# Patient Record
Sex: FEMALE | Race: WHITE | NOT HISPANIC OR LATINO | Employment: OTHER | URBAN - METROPOLITAN AREA
[De-identification: names, ages, dates, MRNs, and addresses within clinical notes are randomized per-mention and may not be internally consistent; named-entity substitution may affect disease eponyms.]

---

## 2017-07-24 ENCOUNTER — GENERIC CONVERSION - ENCOUNTER (OUTPATIENT)
Dept: OTHER | Facility: OTHER | Age: 62
End: 2017-07-24

## 2017-08-31 ENCOUNTER — GENERIC CONVERSION - ENCOUNTER (OUTPATIENT)
Dept: OTHER | Facility: OTHER | Age: 62
End: 2017-08-31

## 2018-01-15 NOTE — RESULT NOTES
Verified Results  (1) CBC/ PLT (NO DIFF) [id number] 12:00AM [employer]     Test Name Result Flag Reference   WBC 8 6 x10E3/uL  3 4-10 8   RBC 4 92 x10E6/uL  3 77-5 28   Hemoglobin 13 8 g/dL  11 1-15 9   Hematocrit 42 8 %  34 0-46  6   MCV 87 fL  79-97   MCH 28 0 pg  26 6-33 0   MCHC 32 2 g/dL  31 5-35 7   RDW 14 4 %  12 3-15 4   Platelets 071 B17P0/CS H 150-379     (1) COMPREHENSIVE METABOLIC PANEL [id number] 71:05MN [employer]     Test Name Result Flag Reference   Glucose, Serum 132 mg/dL H 65-99   BUN 25 mg/dL  8-27   Creatinine, Serum 0 89 mg/dL  0 57-1 00   eGFR If NonAfricn Am 71 mL/min/1 73  >59   eGFR If Africn Am 81 mL/min/1 73  >59   BUN/Creatinine Ratio 28 H 11-26   Sodium, Serum 138 mmol/L  134-144   Potassium, Serum 5 1 mmol/L  3 5-5 2   Chloride, Serum 95 mmol/L L    Carbon Dioxide, Total 16 mmol/L L 18-29   Calcium, Serum 9 8 mg/dL  8 7-10 3   Protein, Total, Serum 7 3 g/dL  6 0-8 5   Albumin, Serum 4 8 g/dL  3 6-4 8   Globulin, Total 2 5 g/dL  1 5-4 5   A/G Ratio 1 9  1 1-2 5   Bilirubin, Total 0 2 mg/dL  0 0-1 2   Alkaline Phosphatase, S 86 IU/L     AST (SGOT) 28 IU/L  0-40   ALT (SGPT) 15 IU/L  0-32     (1) LIPID PANEL, FASTING [id number] 12:00AM [employer]     Test Name Result Flag Reference   Cholesterol, Total 233 mg/dL H 100-199   Triglycerides 118 mg/dL  0-149   HDL Cholesterol 86 mg/dL  >39   According to ATP-III Guidelines, HDL-C >59 mg/dL is considered a  negative risk factor for CHD  VLDL Cholesterol Marek 24 mg/dL  5-40   LDL Cholesterol Calc 123 mg/dL H 0-99   T  Chol/HDL Ratio 2 7 ratio units  0 0-4 4   T  Chol/HDL Ratio                                                             Men  Women                                               1/2 Avg  Risk  3 4    3 3                                                   Avg Risk  5 0    4 4                                                2X Avg  Risk  9 6    7 1                                                3X Avg  Risk 23 4 11 0     (1) T3 TOTAL [id number] 12:00AM [facility]     Test Name Result Flag Reference   Triiodothyronine (T3) 102 ng/dL       (1) TSH [id number] 12:00AM [facility]     Test Name Result Flag Reference   TSH 2 670 uIU/mL  0 450-4 500     (1) URIC ACID [id number] 12:00AM [facility]     Test Name Result Flag Reference   Uric Acid, Serum 4 6 mg/dL  2 5-7 1   Therapeutic target for gout patients: <6 0     (LC) Thyroxine (T4) Free, Direct, S [id number] 12:00AM [facility]     Test Name Result Flag Reference   T4,Free(Direct) 1 30 ng/dL  0 82-1 77       Discussion/Summary   MARTIN HERNANDEZ   LOOKED PRETTY GOOD      CHOL  - PROBABLY WOULD HAVE BEEN AROUND 210 IF YOU WERE FASTING - GREAT GOOD CHOL SO NO CONCERNS     SUGAR WAS FLAGGED BUT EXPECTED LEVEL IN A NON FASTING SAMPLE      CALL IF YOU HAVE ANY QUESTIONS      DR MAN

## 2018-07-06 DIAGNOSIS — I10 ESSENTIAL HYPERTENSION: ICD-10-CM

## 2018-07-06 DIAGNOSIS — E03.9 ACQUIRED HYPOTHYROIDISM: Primary | ICD-10-CM

## 2018-07-08 RX ORDER — AMLODIPINE BESYLATE AND BENAZEPRIL HYDROCHLORIDE 10; 40 MG/1; MG/1
CAPSULE ORAL
Qty: 90 CAPSULE | Refills: 2 | Status: SHIPPED | OUTPATIENT
Start: 2018-07-08 | End: 2019-07-03 | Stop reason: SDUPTHER

## 2018-07-08 RX ORDER — LEVOTHYROXINE SODIUM 0.05 MG/1
TABLET ORAL
Qty: 90 TABLET | Refills: 2 | Status: SHIPPED | OUTPATIENT
Start: 2018-07-08 | End: 2019-07-03 | Stop reason: SDUPTHER

## 2018-09-25 DIAGNOSIS — R92.8 ABNORMAL MAMMOGRAM: Primary | ICD-10-CM

## 2019-07-03 DIAGNOSIS — I10 ESSENTIAL HYPERTENSION: ICD-10-CM

## 2019-07-03 DIAGNOSIS — E03.9 ACQUIRED HYPOTHYROIDISM: ICD-10-CM

## 2019-07-03 RX ORDER — LEVOTHYROXINE SODIUM 0.05 MG/1
TABLET ORAL
Qty: 90 TABLET | Refills: 3 | Status: SHIPPED | OUTPATIENT
Start: 2019-07-03 | End: 2020-06-29

## 2019-07-03 RX ORDER — AMLODIPINE BESYLATE AND BENAZEPRIL HYDROCHLORIDE 10; 40 MG/1; MG/1
CAPSULE ORAL
Qty: 90 CAPSULE | Refills: 3 | Status: SHIPPED | OUTPATIENT
Start: 2019-07-03 | End: 2020-06-29

## 2019-09-26 DIAGNOSIS — R92.8 ABNORMAL MAMMOGRAM: ICD-10-CM

## 2020-06-27 DIAGNOSIS — I10 ESSENTIAL HYPERTENSION: ICD-10-CM

## 2020-06-27 DIAGNOSIS — E03.9 ACQUIRED HYPOTHYROIDISM: ICD-10-CM

## 2020-06-29 RX ORDER — AMLODIPINE BESYLATE AND BENAZEPRIL HYDROCHLORIDE 10; 40 MG/1; MG/1
CAPSULE ORAL
Qty: 90 CAPSULE | Refills: 3 | Status: SHIPPED | OUTPATIENT
Start: 2020-06-29 | End: 2021-06-01

## 2020-06-29 RX ORDER — LEVOTHYROXINE SODIUM 0.05 MG/1
TABLET ORAL
Qty: 90 TABLET | Refills: 3 | Status: SHIPPED | OUTPATIENT
Start: 2020-06-29 | End: 2021-06-01

## 2021-05-26 ENCOUNTER — OFFICE VISIT (OUTPATIENT)
Dept: FAMILY MEDICINE CLINIC | Facility: CLINIC | Age: 66
End: 2021-05-26
Payer: COMMERCIAL

## 2021-05-26 VITALS
HEIGHT: 63 IN | HEART RATE: 100 BPM | BODY MASS INDEX: 19.49 KG/M2 | WEIGHT: 110 LBS | SYSTOLIC BLOOD PRESSURE: 138 MMHG | RESPIRATION RATE: 16 BRPM | TEMPERATURE: 98.2 F | DIASTOLIC BLOOD PRESSURE: 66 MMHG | OXYGEN SATURATION: 98 %

## 2021-05-26 DIAGNOSIS — Z13.220 SCREENING FOR HYPERLIPIDEMIA: ICD-10-CM

## 2021-05-26 DIAGNOSIS — Z76.89 ESTABLISHING CARE WITH NEW DOCTOR, ENCOUNTER FOR: Primary | ICD-10-CM

## 2021-05-26 DIAGNOSIS — R01.1 SYSTOLIC EJECTION MURMUR: ICD-10-CM

## 2021-05-26 DIAGNOSIS — Z11.4 SCREENING FOR HIV (HUMAN IMMUNODEFICIENCY VIRUS): ICD-10-CM

## 2021-05-26 DIAGNOSIS — I10 ESSENTIAL HYPERTENSION: ICD-10-CM

## 2021-05-26 DIAGNOSIS — Z11.59 ENCOUNTER FOR HEPATITIS C SCREENING TEST FOR LOW RISK PATIENT: ICD-10-CM

## 2021-05-26 DIAGNOSIS — E03.9 HYPOTHYROIDISM, UNSPECIFIED TYPE: ICD-10-CM

## 2021-05-26 DIAGNOSIS — Z12.31 ENCOUNTER FOR SCREENING MAMMOGRAM FOR BREAST CANCER: ICD-10-CM

## 2021-05-26 DIAGNOSIS — K21.9 GERD WITHOUT ESOPHAGITIS: ICD-10-CM

## 2021-05-26 PROCEDURE — 1036F TOBACCO NON-USER: CPT | Performed by: FAMILY MEDICINE

## 2021-05-26 PROCEDURE — 3725F SCREEN DEPRESSION PERFORMED: CPT | Performed by: FAMILY MEDICINE

## 2021-05-26 PROCEDURE — 99203 OFFICE O/P NEW LOW 30 MIN: CPT | Performed by: FAMILY MEDICINE

## 2021-05-26 RX ORDER — AMANTADINE HYDROCHLORIDE 100 MG/1
100 CAPSULE, GELATIN COATED ORAL 2 TIMES DAILY
COMMUNITY
End: 2021-09-07

## 2021-05-26 RX ORDER — OMEPRAZOLE 40 MG/1
40 CAPSULE, DELAYED RELEASE ORAL DAILY
COMMUNITY
End: 2022-03-08 | Stop reason: DRUGHIGH

## 2021-05-26 RX ORDER — ESTRADIOL 10 UG/1
INSERT VAGINAL
COMMUNITY
Start: 2015-04-20 | End: 2021-09-07

## 2021-05-26 NOTE — PROGRESS NOTES
Assessment/Plan:    No problem-specific Assessment & Plan notes found for this encounter  Diagnoses and all orders for this visit:    Establishing care with new doctor, encounter for    Essential hypertension  -     Echo complete with contrast if indicated; Future  -     Comprehensive metabolic panel  -     Uric acid    Hypothyroidism, unspecified type  -     TSH, 3rd generation    GERD without esophagitis  -     CBC and differential    Systolic ejection murmur  -     Echo complete with contrast if indicated; Future    Encounter for hepatitis C screening test for low risk patient  -     Hepatitis C antibody    Screening for HIV (human immunodeficiency virus)  -     Human Immunodeficiency Virus 1/2 Antigen / Antibody ( Fourth Generation) with Reflex Testing    Encounter for screening mammogram for breast cancer  -     Mammo screening bilateral w 3d & cad; Future    Screening for hyperlipidemia  -     Lipid panel    Other orders  -     estradiol (Vagifem) 10 MCG TABS vaginal tablet; Insert into the vagina  -     amantadine (SYMMETREL) 100 mg capsule; Take 100 mg by mouth 2 (two) times a day  -     omeprazole (PriLOSEC) 40 MG capsule; Take 40 mg by mouth daily          Patient Instructions   CONTINUE CURRENT TREATMENT PLAN  MONITOR DIETARY SODIUM, CHOL INTAKE  ENCOURAGE PHYSICAL ACTIVITY      RV 3 M FOR CPX, SOONER PRN      BW TODAY  ECHO FOR MURMUR EVALUATION      Return in about 3 months (around 8/26/2021) for Annual physical     Subjective:      Patient ID: Erwin Rahman is a 73 y o  female      Chief Complaint   Patient presents with   [address]     B/P & THYROID CHECK       RE ESTABLISH PATIENT TO THE PRACTICE    REVIEWED PAST MEDICAL HISTORY AND MEDICATIONS    DISCUSSED PRACTICE ISSUES    RECOMMEND CPX IN THE NEAR FUTURE        The following portions of the patient's history were reviewed and updated as appropriate: allergies, current medications, past family history, past medical history, past social history, past surgical history and problem list     Review of Systems   Constitutional: Negative for chills, fatigue and fever  HENT: Negative for congestion, ear discharge, ear pain, mouth sores, postnasal drip, sore throat and trouble swallowing  Eyes: Negative for pain, discharge and visual disturbance  Respiratory: Negative for cough, shortness of breath and wheezing  Cardiovascular: Negative for chest pain, palpitations and leg swelling  Gastrointestinal: Negative for abdominal distention, abdominal pain, blood in stool, diarrhea and nausea  Endocrine: Negative for polydipsia, polyphagia and polyuria  Genitourinary: Negative for dysuria, frequency, hematuria and urgency  Musculoskeletal: Negative for arthralgias, gait problem and joint swelling  Skin: Negative for pallor and rash  Neurological: Negative for dizziness, syncope, speech difficulty, weakness, light-headedness, numbness and headaches  Hematological: Negative for adenopathy  Psychiatric/Behavioral: Negative for behavioral problems, confusion and sleep disturbance  The patient is not nervous/anxious  Current Outpatient Medications   Medication Sig Dispense Refill    amantadine (SYMMETREL) 100 mg capsule Take 100 mg by mouth 2 (two) times a day      amLODIPine-benazepril (LOTREL) 10-40 MG per capsule TAKE 1 CAPSULE DAILY 90 capsule 3    estradiol (Vagifem) 10 MCG TABS vaginal tablet Insert into the vagina      levothyroxine 50 mcg tablet TAKE 1 TABLET DAILY AS DIRECTED 90 tablet 3    omeprazole (PriLOSEC) 40 MG capsule Take 40 mg by mouth daily       No current facility-administered medications for this visit  Objective:    /66   Pulse 100   Temp 98 2 °F (36 8 °C) (Temporal)   Resp 16   Ht 5' 3" (1 6 m)   Wt 49 9 kg (110 lb)   SpO2 98%   BMI 19 49 kg/m²        Physical Exam  Constitutional:       Appearance: She is well-developed  HENT:      Head: Normocephalic and atraumatic     Eyes: General:         Right eye: No discharge  Left eye: No discharge  Conjunctiva/sclera: Conjunctivae normal       Pupils: Pupils are equal, round, and reactive to light  Neck:      Musculoskeletal: Normal range of motion and neck supple  Thyroid: No thyromegaly  Cardiovascular:      Rate and Rhythm: Normal rate and regular rhythm  Heart sounds: Murmur present  Pulmonary:      Effort: Pulmonary effort is normal  No respiratory distress  Breath sounds: Normal breath sounds  No wheezing or rales  Abdominal:      General: Bowel sounds are normal       Palpations: Abdomen is soft  Tenderness: There is no abdominal tenderness  Musculoskeletal: Normal range of motion  General: No tenderness  Lymphadenopathy:      Cervical: No cervical adenopathy  Skin:     General: Skin is warm and dry  Findings: No erythema or rash  Neurological:      Mental Status: She is alert and oriented to person, place, and time  Psychiatric:         Behavior: Behavior normal          Thought Content:  Thought content normal          Judgment: Judgment normal                 Liyah Orta MD

## 2021-05-26 NOTE — PATIENT INSTRUCTIONS
CONTINUE CURRENT TREATMENT PLAN  MONITOR DIETARY SODIUM, CHOL INTAKE  ENCOURAGE PHYSICAL ACTIVITY      RV 3 M FOR CPX, SOONER PRN      BW TODAY  ECHO FOR MURMUR EVALUATION

## 2021-05-27 LAB
ALBUMIN SERPL-MCNC: 4.4 G/DL (ref 3.8–4.8)
ALBUMIN/GLOB SERPL: 1.8 {RATIO} (ref 1.2–2.2)
ALP SERPL-CCNC: 92 IU/L (ref 48–121)
ALT SERPL-CCNC: 16 IU/L (ref 0–32)
AST SERPL-CCNC: 26 IU/L (ref 0–40)
BASOPHILS # BLD AUTO: 0.1 X10E3/UL (ref 0–0.2)
BASOPHILS NFR BLD AUTO: 1 %
BILIRUB SERPL-MCNC: <0.2 MG/DL (ref 0–1.2)
BUN SERPL-MCNC: 27 MG/DL (ref 8–27)
BUN/CREAT SERPL: 28 (ref 12–28)
CALCIUM SERPL-MCNC: 9.7 MG/DL (ref 8.7–10.3)
CHLORIDE SERPL-SCNC: 103 MMOL/L (ref 96–106)
CHOLEST SERPL-MCNC: 179 MG/DL (ref 100–199)
CHOLEST/HDLC SERPL: 2.7 RATIO (ref 0–4.4)
CO2 SERPL-SCNC: 21 MMOL/L (ref 20–29)
CREAT SERPL-MCNC: 0.98 MG/DL (ref 0.57–1)
EOSINOPHIL # BLD AUTO: 0 X10E3/UL (ref 0–0.4)
EOSINOPHIL NFR BLD AUTO: 0 %
ERYTHROCYTE [DISTWIDTH] IN BLOOD BY AUTOMATED COUNT: 18.8 % (ref 11.7–15.4)
GLOBULIN SER-MCNC: 2.5 G/DL (ref 1.5–4.5)
GLUCOSE SERPL-MCNC: 103 MG/DL (ref 65–99)
HCT VFR BLD AUTO: 23.9 % (ref 34–46.6)
HCV AB S/CO SERPL IA: <0.1 S/CO RATIO (ref 0–0.9)
HDLC SERPL-MCNC: 66 MG/DL
HGB BLD-MCNC: 6.2 G/DL (ref 11.1–15.9)
HIV 1+2 AB+HIV1 P24 AG SERPL QL IA: NON REACTIVE
IMM GRANULOCYTES # BLD: 0 X10E3/UL (ref 0–0.1)
IMM GRANULOCYTES NFR BLD: 0 %
LDLC SERPL CALC-MCNC: 98 MG/DL (ref 0–99)
LYMPHOCYTES # BLD AUTO: 1.2 X10E3/UL (ref 0.7–3.1)
LYMPHOCYTES NFR BLD AUTO: 20 %
MCH RBC QN AUTO: 17.2 PG (ref 26.6–33)
MCHC RBC AUTO-ENTMCNC: 25.9 G/DL (ref 31.5–35.7)
MCV RBC AUTO: 66 FL (ref 79–97)
MONOCYTES # BLD AUTO: 0.8 X10E3/UL (ref 0.1–0.9)
MONOCYTES NFR BLD AUTO: 14 %
NEUTROPHILS # BLD AUTO: 3.7 X10E3/UL (ref 1.4–7)
NEUTROPHILS NFR BLD AUTO: 65 %
NRBC BLD AUTO-RTO: 8 % (ref 0–0)
PLATELET # BLD AUTO: 361 X10E3/UL (ref 150–450)
POTASSIUM SERPL-SCNC: 5.2 MMOL/L (ref 3.5–5.2)
PROT SERPL-MCNC: 6.9 G/DL (ref 6–8.5)
RBC # BLD AUTO: 3.6 X10E6/UL (ref 3.77–5.28)
SL AMB EGFR AFRICAN AMERICAN: 70 ML/MIN/1.73
SL AMB EGFR NON AFRICAN AMERICAN: 61 ML/MIN/1.73
SL AMB VLDL CHOLESTEROL CALC: 15 MG/DL (ref 5–40)
SODIUM SERPL-SCNC: 137 MMOL/L (ref 134–144)
TRIGL SERPL-MCNC: 81 MG/DL (ref 0–149)
TSH SERPL DL<=0.005 MIU/L-ACNC: 3.2 UIU/ML (ref 0.45–4.5)
URATE SERPL-MCNC: 5.8 MG/DL (ref 3–7.2)
WBC # BLD AUTO: 5.7 X10E3/UL (ref 3.4–10.8)

## 2021-06-01 ENCOUNTER — TRANSITIONAL CARE MANAGEMENT (OUTPATIENT)
Dept: FAMILY MEDICINE CLINIC | Facility: CLINIC | Age: 66
End: 2021-06-01

## 2021-06-01 DIAGNOSIS — E03.9 ACQUIRED HYPOTHYROIDISM: ICD-10-CM

## 2021-06-01 DIAGNOSIS — I10 ESSENTIAL HYPERTENSION: ICD-10-CM

## 2021-06-01 RX ORDER — AMLODIPINE BESYLATE AND BENAZEPRIL HYDROCHLORIDE 10; 40 MG/1; MG/1
CAPSULE ORAL
Qty: 90 CAPSULE | Refills: 3 | Status: SHIPPED | OUTPATIENT
Start: 2021-06-01 | End: 2021-08-05 | Stop reason: SDUPTHER

## 2021-06-01 RX ORDER — LEVOTHYROXINE SODIUM 0.05 MG/1
TABLET ORAL
Qty: 90 TABLET | Refills: 3 | Status: SHIPPED | OUTPATIENT
Start: 2021-06-01 | End: 2021-08-05 | Stop reason: SDUPTHER

## 2021-06-08 ENCOUNTER — RA CDI HCC (OUTPATIENT)
Dept: OTHER | Facility: HOSPITAL | Age: 66
End: 2021-06-08

## 2021-06-08 NOTE — PROGRESS NOTES
[facility] 76  coding opportunities          Chart reviewed, no opportunity found: CHART REVIEWED, NO OPPORTUNITY FOUND              Patients insurance company: [employer]

## 2021-06-11 ENCOUNTER — OFFICE VISIT (OUTPATIENT)
Dept: FAMILY MEDICINE CLINIC | Facility: CLINIC | Age: 66
End: 2021-06-11
Payer: COMMERCIAL

## 2021-06-11 VITALS
OXYGEN SATURATION: 96 % | TEMPERATURE: 97.1 F | RESPIRATION RATE: 16 BRPM | WEIGHT: 110 LBS | DIASTOLIC BLOOD PRESSURE: 68 MMHG | BODY MASS INDEX: 19.49 KG/M2 | HEIGHT: 63 IN | SYSTOLIC BLOOD PRESSURE: 138 MMHG | HEART RATE: 94 BPM

## 2021-06-11 DIAGNOSIS — D50.0 IRON DEFICIENCY ANEMIA DUE TO CHRONIC BLOOD LOSS: ICD-10-CM

## 2021-06-11 DIAGNOSIS — Z76.89 ENCOUNTER FOR SUPPORT AND COORDINATION OF TRANSITION OF CARE: Primary | ICD-10-CM

## 2021-06-11 DIAGNOSIS — K21.9 GERD WITHOUT ESOPHAGITIS: ICD-10-CM

## 2021-06-11 DIAGNOSIS — R01.1 SYSTOLIC EJECTION MURMUR: ICD-10-CM

## 2021-06-11 DIAGNOSIS — I10 ESSENTIAL HYPERTENSION: ICD-10-CM

## 2021-06-11 PROCEDURE — 3008F BODY MASS INDEX DOCD: CPT | Performed by: FAMILY MEDICINE

## 2021-06-11 PROCEDURE — 99496 TRANSJ CARE MGMT HIGH F2F 7D: CPT | Performed by: NURSE PRACTITIONER

## 2021-06-12 PROBLEM — D50.0 IRON DEFICIENCY ANEMIA DUE TO CHRONIC BLOOD LOSS: Status: ACTIVE | Noted: 2021-06-12

## 2021-06-12 NOTE — PROGRESS NOTES
Assessment/Plan:    1  Encounter for support and coordination of transition of care  Comments:  Reviewed discharge instructions and medications  Advise specialty follow up as instructed  2  Iron deficiency anemia due to chronic blood loss  Assessment & Plan:  Pt has scheduled follow up with GI and hematology - Dr Ashlyn Ma  She is recommended to have a bone marrow biopsy r/t history of Non Hodgkin Lymphoma 40 years ago and she reports she is hesitant to proceed  Advise further discussion with Dr Ashlyn Ma  3  GERD without esophagitis  Assessment & Plan:  Taking omeprazole 40 mg daily  Symptoms currently stable  4  Systolic ejection murmur  Assessment & Plan:  ECHO was completed through [facility] and we will request these records for review  5  Essential hypertension  Assessment & Plan:  BP stable  Denies SOB, chest pain or extremity swelling  Subjective:      Patient ID: Lisset Flores is a 73 y o  female  Chief Complaint   Patient presents with    Transition of Care Management       Kassandra Smith is a 73year old female with history of NH Lymphoma and gastric ulcers who presents to the office for a transition of care s/p hospitalization for anemia and Hgb 6 2  Currently, she denies fever, chills, chest pain, shortness of breath or fatigue  Doing well overall  The following portions of the patient's history were reviewed and updated as appropriate: allergies, current medications, past family history, past medical history, past social history, past surgical history and problem list     Review of Systems   Constitutional: Negative for chills, fatigue and fever  Respiratory: Negative for chest tightness and shortness of breath  Cardiovascular: Negative for chest pain, palpitations and leg swelling  Gastrointestinal: Negative for abdominal pain and blood in stool           Current Outpatient Medications   Medication Sig Dispense Refill    amLODIPine-benazepril (LOTREL) 10-40 MG per capsule TAKE 1 CAPSULE DAILY 90 capsule 3    levothyroxine 50 mcg tablet TAKE 1 TABLET DAILY AS DIRECTED 90 tablet 3    omeprazole (PriLOSEC) 40 MG capsule Take 40 mg by mouth daily      amantadine (SYMMETREL) 100 mg capsule Take 100 mg by mouth 2 (two) times a day      estradiol (Vagifem) 10 MCG TABS vaginal tablet Insert into the vagina       No current facility-administered medications for this visit  Objective:    /68   Pulse 94   Temp (!) 97 1 °F (36 2 °C) (Temporal)   Resp 16   Ht 5' 3" (1 6 m)   Wt 49 9 kg (110 lb)   SpO2 96%   BMI 19 49 kg/m²        Physical Exam  Vitals signs reviewed  Constitutional:       General: She is not in acute distress  Appearance: She is well-developed  She is not diaphoretic  HENT:      Head: Normocephalic and atraumatic  Eyes:      General:         Right eye: No discharge  Left eye: No discharge  Conjunctiva/sclera: Conjunctivae normal    Neck:      Musculoskeletal: Normal range of motion and neck supple  Thyroid: No thyromegaly  Cardiovascular:      Rate and Rhythm: Normal rate and regular rhythm  Heart sounds: Normal heart sounds  Pulmonary:      Effort: Pulmonary effort is normal  No respiratory distress  Breath sounds: Normal breath sounds  No decreased breath sounds, wheezing, rhonchi or rales  Abdominal:      General: Bowel sounds are normal  There is no distension  Palpations: Abdomen is soft  Tenderness: There is no abdominal tenderness  Lymphadenopathy:      Cervical: No cervical adenopathy  Skin:     General: Skin is warm and dry  Findings: No rash  Neurological:      Mental Status: She is alert and oriented to person, place, and time  Psychiatric:         Behavior: Behavior normal          Thought Content: Thought content normal          Judgment: Judgment normal            Mira Gay NP student, assisted in history and PE         PRICE Cruz     TCM Call (since 5/12/2021)     Date and time call was made  6/1/2021  1:22 PM    Hospital care reviewed  Records reviewed    [address]    Date of Admission  05/27/21    Date of discharge  05/29/21    Diagnosis  Acute chronic anemia    Disposition  Home    Were the patients medications reviewed and updated  Yes    Current Symptoms  None      TCM Call (since 5/12/2021)     Post hospital issues  None    Should patient be enrolled in anticoag monitoring? No    Scheduled for follow up?   Yes    Patients specialists  Other (comment)    Other specialists names  hemotologist    Other specialists contcat #  Dr Nguyễn Burton  [facility])    Did you obtain your prescribed medications  Yes    Do you need help managing your prescriptions or medications  No    Is transportation to your appointment needed  No    I have advised the patient to call PCP with any new or worsening symptoms  [address] [place] [address] or [employer] other    Support System  Family    The type of support provided  Physical; Emotional; Financial    Do you have social support  Yes, as much as I need    Are you recieving any outpatient services  No    Are you recieving home care services  No    Are you using any community resources  No

## 2021-06-12 NOTE — ASSESSMENT & PLAN NOTE
Pt has scheduled follow up with GI and hematology - Dr Luis Redding  She is recommended to have a bone marrow biopsy r/t history of Non Hodgkin Lymphoma 40 years ago and she reports she is hesitant to proceed  Advise further discussion with Dr Luis Redding

## 2021-06-12 NOTE — ASSESSMENT & PLAN NOTE
ECHO was completed through [facility] and we will request these records for review

## 2021-07-08 ENCOUNTER — APPOINTMENT (OUTPATIENT)
Dept: RADIOLOGY | Facility: CLINIC | Age: 66
End: 2021-07-08
Payer: COMMERCIAL

## 2021-07-08 ENCOUNTER — OFFICE VISIT (OUTPATIENT)
Dept: FAMILY MEDICINE CLINIC | Facility: CLINIC | Age: 66
End: 2021-07-08
Payer: COMMERCIAL

## 2021-07-08 VITALS
DIASTOLIC BLOOD PRESSURE: 76 MMHG | SYSTOLIC BLOOD PRESSURE: 134 MMHG | HEART RATE: 100 BPM | TEMPERATURE: 97.8 F | WEIGHT: 108 LBS | BODY MASS INDEX: 19.14 KG/M2 | OXYGEN SATURATION: 97 % | HEIGHT: 63 IN | RESPIRATION RATE: 16 BRPM

## 2021-07-08 DIAGNOSIS — M79.672 LEFT FOOT PAIN: ICD-10-CM

## 2021-07-08 DIAGNOSIS — R22.42 LOCALIZED SWELLING OF LEFT FOOT: ICD-10-CM

## 2021-07-08 DIAGNOSIS — M79.672 LEFT FOOT PAIN: Primary | ICD-10-CM

## 2021-07-08 PROCEDURE — 99213 OFFICE O/P EST LOW 20 MIN: CPT | Performed by: NURSE PRACTITIONER

## 2021-07-08 PROCEDURE — 73630 X-RAY EXAM OF FOOT: CPT

## 2021-07-08 PROCEDURE — 1036F TOBACCO NON-USER: CPT | Performed by: NURSE PRACTITIONER

## 2021-07-08 NOTE — PROGRESS NOTES
Assessment/Plan:    1  Left foot pain  -     XR foot 3+ vw left; Future; Expected date: 07/08/2021    2  Localized swelling of left foot  -     XR foot 3+ vw left; Future; Expected date: 07/08/2021            Patient Instructions   Aleve 1 tablet every 8 hours, take with food (for 5 days)      Return in about 1 week (around 7/15/2021), or if symptoms worsen or fail to improve  Subjective:      Patient ID: Divine Castaneda is a 73 y o  female  Chief Complaint   Patient presents with    left foot soreness       Laxmi Baxter is a 73year old female who presents to the office for evaluation and management of left foot swelling  Reports her left foot has been swollen for a little over 2 weeks  Denies injury  Reports her right foot had similar symptoms about 7 months ago and resolved on its own after about 2 weeks  States her symptoms are worse throughout the day when she is on her feet  Denies ankle or calf pain  Reports swelling of the entire foot  The following portions of the patient's history were reviewed and updated as appropriate: allergies, current medications, past family history, past medical history, past social history, past surgical history and problem list     Review of Systems   Musculoskeletal: Positive for arthralgias, joint swelling and myalgias  Current Outpatient Medications   Medication Sig Dispense Refill    amLODIPine-benazepril (LOTREL) 10-40 MG per capsule TAKE 1 CAPSULE DAILY 90 capsule 3    estradiol (Vagifem) 10 MCG TABS vaginal tablet Insert into the vagina      levothyroxine 50 mcg tablet TAKE 1 TABLET DAILY AS DIRECTED 90 tablet 3    omeprazole (PriLOSEC) 40 MG capsule Take 40 mg by mouth daily      amantadine (SYMMETREL) 100 mg capsule Take 100 mg by mouth 2 (two) times a day (Patient not taking: Reported on 7/8/2021)       No current facility-administered medications for this visit         Objective:    /76   Pulse 100   Temp 97 8 °F (36 6 °C) (Temporal) Resp 16   Ht 5' 3" (1 6 m)   Wt 49 kg (108 lb)   SpO2 97%   BMI 19 13 kg/m²        Physical Exam  Vitals reviewed  HENT:      Head: Normocephalic  Musculoskeletal:      Left foot: Normal range of motion  No deformity or bunion  Feet:    Feet:      Left foot:      Skin integrity: No erythema  Comments: Pt indicates pain on the anterior aspect of the distal left foot  Grade I pitting edema noted of the left foot  Neurological:      Mental Status: She is alert and oriented to person, place, and time     Psychiatric:         Mood and Affect: Mood normal                 PRICE Guerra

## 2021-07-12 ENCOUNTER — OFFICE VISIT (OUTPATIENT)
Dept: OBGYN CLINIC | Facility: CLINIC | Age: 66
End: 2021-07-12
Payer: COMMERCIAL

## 2021-07-12 ENCOUNTER — APPOINTMENT (OUTPATIENT)
Dept: RADIOLOGY | Facility: CLINIC | Age: 66
End: 2021-07-12
Payer: COMMERCIAL

## 2021-07-12 VITALS
HEIGHT: 63 IN | HEART RATE: 90 BPM | DIASTOLIC BLOOD PRESSURE: 66 MMHG | WEIGHT: 108 LBS | BODY MASS INDEX: 19.14 KG/M2 | SYSTOLIC BLOOD PRESSURE: 138 MMHG

## 2021-07-12 DIAGNOSIS — S92.325A CLOSED NONDISPLACED FRACTURE OF SECOND METATARSAL BONE OF LEFT FOOT, INITIAL ENCOUNTER: ICD-10-CM

## 2021-07-12 PROCEDURE — 28470 CLTX METATARSAL FX WO MNP EA: CPT | Performed by: ORTHOPAEDIC SURGERY

## 2021-07-12 PROCEDURE — 73630 X-RAY EXAM OF FOOT: CPT

## 2021-07-12 PROCEDURE — 99203 OFFICE O/P NEW LOW 30 MIN: CPT | Performed by: ORTHOPAEDIC SURGERY

## 2021-07-12 PROCEDURE — 3008F BODY MASS INDEX DOCD: CPT | Performed by: ORTHOPAEDIC SURGERY

## 2021-07-12 PROCEDURE — 1036F TOBACCO NON-USER: CPT | Performed by: ORTHOPAEDIC SURGERY
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
## 2021-08-05 DIAGNOSIS — I10 ESSENTIAL HYPERTENSION: ICD-10-CM

## 2021-08-05 DIAGNOSIS — E03.9 ACQUIRED HYPOTHYROIDISM: ICD-10-CM

## 2021-08-05 RX ORDER — LEVOTHYROXINE SODIUM 0.05 MG/1
50 TABLET ORAL DAILY
Qty: 90 TABLET | Refills: 3 | Status: SHIPPED | OUTPATIENT
Start: 2021-08-05 | End: 2021-09-08 | Stop reason: SDUPTHER

## 2021-08-05 RX ORDER — AMLODIPINE BESYLATE AND BENAZEPRIL HYDROCHLORIDE 10; 40 MG/1; MG/1
1 CAPSULE ORAL DAILY
Qty: 90 CAPSULE | Refills: 3 | Status: SHIPPED | OUTPATIENT
Start: 2021-08-05 | End: 2021-09-07 | Stop reason: HOSPADM

## 2021-08-09 ENCOUNTER — OFFICE VISIT (OUTPATIENT)
Dept: OBGYN CLINIC | Facility: CLINIC | Age: 66
End: 2021-08-09

## 2021-08-09 ENCOUNTER — APPOINTMENT (OUTPATIENT)
Dept: RADIOLOGY | Facility: CLINIC | Age: 66
End: 2021-08-09
Payer: COMMERCIAL

## 2021-08-09 VITALS
WEIGHT: 108 LBS | SYSTOLIC BLOOD PRESSURE: 118 MMHG | HEART RATE: 90 BPM | BODY MASS INDEX: 19.14 KG/M2 | DIASTOLIC BLOOD PRESSURE: 62 MMHG | HEIGHT: 63 IN

## 2021-08-09 DIAGNOSIS — S92.325A CLOSED NONDISPLACED FRACTURE OF SECOND METATARSAL BONE OF LEFT FOOT, INITIAL ENCOUNTER: Primary | ICD-10-CM

## 2021-08-09 DIAGNOSIS — S92.325A CLOSED NONDISPLACED FRACTURE OF SECOND METATARSAL BONE OF LEFT FOOT, INITIAL ENCOUNTER: ICD-10-CM

## 2021-08-09 PROCEDURE — 99024 POSTOP FOLLOW-UP VISIT: CPT | Performed by: ORTHOPAEDIC SURGERY

## 2021-08-09 PROCEDURE — 73630 X-RAY EXAM OF FOOT: CPT

## 2021-08-09 PROCEDURE — 3008F BODY MASS INDEX DOCD: CPT | Performed by: ORTHOPAEDIC SURGERY

## 2021-08-09 NOTE — PROGRESS NOTES
Assessment/Plan:  1  Closed nondisplaced fracture of second metatarsal bone of left foot, initial encounter  XR foot 3+ vw left         Jelly Campos is doing very well following her stress fracture in left foot  She has improvement in her pain and swelling  She still has some mild discomfort to palpation in this area and her x-ray shows some callus formation but a continued fracture line  I recommended continued use of the Cam boot for another 3 weeks and follow up in the office for repeat x-ray evaluation  Subjective:   Nichole He is a 73 y o  female who presents   To the office for follow-up for left foot fracture  She has second metatarsal fracture which appeared to be a stress fracture  She was placed in a Cam walker boot for the last 4 weeks  She reports significant improvement in her pain and no pain with walking in a boot  She denies any new injury  She denies any numbness and tingling throughout her foot  History reviewed  No pertinent past medical history  Past Surgical History:   Procedure Laterality Date    ABDOMINAL SURGERY         History reviewed  No pertinent family history      Social History     Occupational History    Not on file   Tobacco Use    Smoking status: Never Smoker    Smokeless tobacco: Never Used   Vaping Use    Vaping Use: Never used   Substance and Sexual Activity    Alcohol use: Yes     Comment: SOC    Drug use: Never    Sexual activity: Not on file         Current Outpatient Medications:     amantadine (SYMMETREL) 100 mg capsule, Take 100 mg by mouth 2 (two) times a day (Patient not taking: Reported on 7/8/2021), Disp: , Rfl:     amLODIPine-benazepril (LOTREL) 10-40 MG per capsule, Take 1 capsule by mouth daily, Disp: 90 capsule, Rfl: 3    estradiol (Vagifem) 10 MCG TABS vaginal tablet, Insert into the vagina, Disp: , Rfl:     levothyroxine 50 mcg tablet, Take 1 tablet (50 mcg total) by mouth daily, Disp: 90 tablet, Rfl: 3    omeprazole (PriLOSEC) 40 MG capsule, Take 40 mg by mouth daily, Disp: , Rfl:     Allergies   Allergen Reactions    Amoxicillin Hives    Clavulanic Acid Hives    Pollen Extract Nasal Congestion    Other Hives and Rash     AMOXICIILLIN       Objective:  Vitals:    08/09/21 0901   BP: 118/62   Pulse: 90       Ortho Exam    Physical Exam  Vitals reviewed  Constitutional:       Appearance: She is well-developed  HENT:      Head: Normocephalic and atraumatic  Eyes:      Conjunctiva/sclera: Conjunctivae normal       Pupils: Pupils are equal, round, and reactive to light  Cardiovascular:      Rate and Rhythm: Normal rate  Pulmonary:      Effort: Pulmonary effort is normal  No respiratory distress  Musculoskeletal:      Cervical back: Normal range of motion and neck supple  Feet:    Feet:      Comments:   Mild tenderness to palpation over second third metatarsal distally  Skin:     General: Skin is warm and dry  Neurological:      Mental Status: She is alert and oriented to person, place, and time  Psychiatric:         Behavior: Behavior normal            I have personally reviewed pertinent films in PACS and my interpretation is as follows: Three-view x-rays of the left foot in the office today demonstrate a stable, healing fracture of the second metatarsal with increased callus formation

## 2021-08-11 DIAGNOSIS — E03.9 HYPOTHYROIDISM, UNSPECIFIED TYPE: ICD-10-CM

## 2021-08-11 DIAGNOSIS — K21.9 GERD WITHOUT ESOPHAGITIS: ICD-10-CM

## 2021-08-11 DIAGNOSIS — I10 ESSENTIAL HYPERTENSION: ICD-10-CM

## 2021-08-11 DIAGNOSIS — D50.0 IRON DEFICIENCY ANEMIA DUE TO CHRONIC BLOOD LOSS: ICD-10-CM

## 2021-08-11 DIAGNOSIS — Z00.00 ENCOUNTER FOR ANNUAL WELLNESS VISIT (AWV) IN MEDICARE PATIENT: Primary | ICD-10-CM

## 2021-08-11 DIAGNOSIS — R01.1 SYSTOLIC EJECTION MURMUR: ICD-10-CM

## 2021-08-30 ENCOUNTER — APPOINTMENT (OUTPATIENT)
Dept: RADIOLOGY | Facility: CLINIC | Age: 66
End: 2021-08-30
Payer: COMMERCIAL

## 2021-08-30 ENCOUNTER — OFFICE VISIT (OUTPATIENT)
Dept: OBGYN CLINIC | Facility: CLINIC | Age: 66
End: 2021-08-30

## 2021-08-30 VITALS
DIASTOLIC BLOOD PRESSURE: 60 MMHG | BODY MASS INDEX: 19.14 KG/M2 | WEIGHT: 108 LBS | HEART RATE: 90 BPM | SYSTOLIC BLOOD PRESSURE: 136 MMHG | HEIGHT: 63 IN

## 2021-08-30 DIAGNOSIS — S92.325A CLOSED NONDISPLACED FRACTURE OF SECOND METATARSAL BONE OF LEFT FOOT, INITIAL ENCOUNTER: Primary | ICD-10-CM

## 2021-08-30 PROCEDURE — 73630 X-RAY EXAM OF FOOT: CPT

## 2021-08-30 PROCEDURE — 99024 POSTOP FOLLOW-UP VISIT: CPT | Performed by: ORTHOPAEDIC SURGERY

## 2021-08-30 PROCEDURE — 3008F BODY MASS INDEX DOCD: CPT | Performed by: ORTHOPAEDIC SURGERY

## 2021-08-30 NOTE — PROGRESS NOTES
Assessment/Plan:  1  Closed nondisplaced fracture of second metatarsal bone of left foot, initial encounter  XR foot 3+ vw left         Krista Coulter has  A stable, healing stress fracture in the second metatarsal left foot  I do believe at this point she is nontender in this area the fracture will continue to heal   Her x-ray show significant callus and should continue to heal on its own  She was advised to gradually come out of the boot and return to regular shoe as tolerated  She can resume activity as tolerated as well  She will follow up only if the pain returns  Subjective:   Javed Magallanes is a 73 y o  female who presents   For follow-up for a second metatarsal fracture believed to be a stress fracture from walking  She has been in a Cam boot for 8 weeks and reports significant improvement in her foot  She denies any pain but feels like it is very full in her foot  She has been able to ambulate in a regular shoe and this does not bother her  She denies any new injury  History reviewed  No pertinent past medical history  Past Surgical History:   Procedure Laterality Date    ABDOMINAL SURGERY         History reviewed  No pertinent family history      Social History     Occupational History    Not on file   Tobacco Use    Smoking status: Never Smoker    Smokeless tobacco: Never Used   Vaping Use    Vaping Use: Never used   Substance and Sexual Activity    Alcohol use: Yes     Comment: SOC    Drug use: Never    Sexual activity: Not on file         Current Outpatient Medications:     amantadine (SYMMETREL) 100 mg capsule, Take 100 mg by mouth 2 (two) times a day (Patient not taking: Reported on 7/8/2021), Disp: , Rfl:     amLODIPine-benazepril (LOTREL) 10-40 MG per capsule, Take 1 capsule by mouth daily, Disp: 90 capsule, Rfl: 3    estradiol (Vagifem) 10 MCG TABS vaginal tablet, Insert into the vagina, Disp: , Rfl:     levothyroxine 50 mcg tablet, Take 1 tablet (50 mcg total) by mouth daily, Disp: 90 tablet, Rfl: 3    omeprazole (PriLOSEC) 40 MG capsule, Take 40 mg by mouth daily, Disp: , Rfl:     Allergies   Allergen Reactions    Amoxicillin Hives    Clavulanic Acid Hives    Pollen Extract Nasal Congestion    Other Hives and Rash     AMOXICIILLIN       Objective:  Vitals:    08/30/21 0906   BP: 136/60   Pulse: 90       Ortho Exam    Physical Exam  Vitals reviewed  Constitutional:       Appearance: She is well-developed  HENT:      Head: Normocephalic and atraumatic  Eyes:      Conjunctiva/sclera: Conjunctivae normal       Pupils: Pupils are equal, round, and reactive to light  Cardiovascular:      Rate and Rhythm: Normal rate  Pulmonary:      Effort: Pulmonary effort is normal  No respiratory distress  Musculoskeletal:      Cervical back: Normal range of motion and neck supple  Comments:  No tenderness to palpation distal second metatarsal   Skin:     General: Skin is warm and dry  Neurological:      Mental Status: She is alert and oriented to person, place, and time     Psychiatric:         Behavior: Behavior normal            I have personally reviewed pertinent films in PACS and my interpretation is as follows:   three-view x-rays of the left foot in the office today demonstrate a stable healing second metatarsal fracture with increased callus formation

## 2021-09-07 ENCOUNTER — OFFICE VISIT (OUTPATIENT)
Dept: FAMILY MEDICINE CLINIC | Facility: CLINIC | Age: 66
End: 2021-09-07
Payer: COMMERCIAL

## 2021-09-07 VITALS
WEIGHT: 106 LBS | HEART RATE: 90 BPM | BODY MASS INDEX: 18.78 KG/M2 | HEIGHT: 63 IN | RESPIRATION RATE: 16 BRPM | TEMPERATURE: 97 F | DIASTOLIC BLOOD PRESSURE: 62 MMHG | OXYGEN SATURATION: 99 % | SYSTOLIC BLOOD PRESSURE: 110 MMHG

## 2021-09-07 DIAGNOSIS — Z00.00 MEDICARE WELCOME VISIT: ICD-10-CM

## 2021-09-07 DIAGNOSIS — I10 ESSENTIAL HYPERTENSION: ICD-10-CM

## 2021-09-07 DIAGNOSIS — Z13.820 SCREENING FOR OSTEOPOROSIS: ICD-10-CM

## 2021-09-07 DIAGNOSIS — Z78.0 ASYMPTOMATIC MENOPAUSE: ICD-10-CM

## 2021-09-07 DIAGNOSIS — S92.325A CLOSED NONDISPLACED FRACTURE OF SECOND METATARSAL BONE OF LEFT FOOT, INITIAL ENCOUNTER: Primary | ICD-10-CM

## 2021-09-07 DIAGNOSIS — E03.9 HYPOTHYROIDISM, UNSPECIFIED TYPE: ICD-10-CM

## 2021-09-07 DIAGNOSIS — Z12.31 ENCOUNTER FOR SCREENING MAMMOGRAM FOR MALIGNANT NEOPLASM OF BREAST: ICD-10-CM

## 2021-09-07 DIAGNOSIS — D50.0 IRON DEFICIENCY ANEMIA DUE TO CHRONIC BLOOD LOSS: ICD-10-CM

## 2021-09-07 DIAGNOSIS — Z12.11 SCREENING FOR COLON CANCER: ICD-10-CM

## 2021-09-07 DIAGNOSIS — K21.9 GERD WITHOUT ESOPHAGITIS: ICD-10-CM

## 2021-09-07 PROBLEM — Z76.89 ESTABLISHING CARE WITH NEW DOCTOR, ENCOUNTER FOR: Status: RESOLVED | Noted: 2021-05-26 | Resolved: 2021-09-07

## 2021-09-07 PROCEDURE — 3078F DIAST BP <80 MM HG: CPT | Performed by: FAMILY MEDICINE

## 2021-09-07 PROCEDURE — 3074F SYST BP LT 130 MM HG: CPT | Performed by: FAMILY MEDICINE

## 2021-09-07 PROCEDURE — 3725F SCREEN DEPRESSION PERFORMED: CPT | Performed by: FAMILY MEDICINE

## 2021-09-07 PROCEDURE — G0402 INITIAL PREVENTIVE EXAM: HCPCS | Performed by: FAMILY MEDICINE

## 2021-09-07 PROCEDURE — 93000 ELECTROCARDIOGRAM COMPLETE: CPT | Performed by: FAMILY MEDICINE

## 2021-09-07 PROCEDURE — 3288F FALL RISK ASSESSMENT DOCD: CPT | Performed by: FAMILY MEDICINE

## 2021-09-07 PROCEDURE — 1036F TOBACCO NON-USER: CPT | Performed by: FAMILY MEDICINE

## 2021-09-07 PROCEDURE — 3008F BODY MASS INDEX DOCD: CPT | Performed by: FAMILY MEDICINE

## 2021-09-07 PROCEDURE — 36415 COLL VENOUS BLD VENIPUNCTURE: CPT | Performed by: FAMILY MEDICINE

## 2021-09-07 NOTE — PROGRESS NOTES
Assessment and Plan:     Problem List Items Addressed This Visit        Digestive    GERD without esophagitis     STABLE  DENIES ANY CP, INDIGESTION, OR COUGH  NOTES NO MELENA OR HEMATOCHEZIA  NO HEMATEMESIS  COMPLIANT WITH MEDICATION  NO CONCERNS    - CONTINUE CURRENT TREATMENT PLAN  - MEDICATION AS PRESCRIBED  - AVOID CAFFEINE, ALCOHOL OR SMOKING           Relevant Orders    CBC and differential       Endocrine    Hypothyroidism     STABLE         Relevant Orders    TSH, 3rd generation       Cardiovascular and Mediastinum    Hypertension     STABLE  DENIES ANY CP, SOB, PALPITATIONS, OR HEADACHE  NOTES NO WATER RETENTION  COMPLIANT WITH MEDICATION  NO CONCERNS    - CONTINUE CURRENT TREATMENT PLAN  - MONITOR DIETARY SODIUM INTAKE  - ENCOURAGE PHYSICAL ACTIVITY  - RV 6 MONTHS           Relevant Orders    Comprehensive metabolic panel    POCT ECG (Completed)       Musculoskeletal and Integument    Closed nondisplaced fracture of second metatarsal bone of left foot - Primary    Relevant Orders    DXA bone density spine hip and pelvis       Other    Iron deficiency anemia due to chronic blood loss    Relevant Orders    CBC and differential    Iron    Encounter for screening mammogram for malignant neoplasm of breast    Relevant Orders    Mammo screening bilateral w cad    Screening for colon cancer    Medicare welcome visit    Asymptomatic menopause    Screening for osteoporosis           Preventive health issues were discussed with patient, and age appropriate screening tests were ordered as noted in patient's After Visit Summary  Personalized health advice and appropriate referrals for health education or preventive services given if needed, as noted in patient's After Visit Summary  History of Present Illness:     Patient presents for Welcome to Medicare visit       Patient Care Team:  Jesse Scott MD as PCP - General  Jesse Scott MD as PCP - [address] [place] [address] (RTE)     Review of Systems:     Review of Systems   Constitutional: Negative for chills, fatigue and fever  HENT: Negative for congestion, ear discharge, ear pain, mouth sores, postnasal drip, sore throat and trouble swallowing  Eyes: Negative for pain, discharge and visual disturbance  Respiratory: Negative for cough, shortness of breath and wheezing  Cardiovascular: Negative for chest pain, palpitations and leg swelling  Gastrointestinal: Negative for abdominal distention, abdominal pain, blood in stool, diarrhea and nausea  Endocrine: Negative for polydipsia, polyphagia and polyuria  Genitourinary: Negative for dysuria, frequency, hematuria and urgency  Musculoskeletal: Negative for arthralgias, gait problem and joint swelling  Skin: Negative for pallor and rash  Neurological: Negative for dizziness, syncope, speech difficulty, weakness, light-headedness, numbness and headaches  Hematological: Negative for adenopathy  Psychiatric/Behavioral: Negative for behavioral problems, confusion and sleep disturbance  The patient is not nervous/anxious  Problem List:     Patient Active Problem List   Diagnosis    GERD without esophagitis    Hypertension    Hypothyroidism    Systolic ejection murmur    Iron deficiency anemia due to chronic blood loss    Encounter for screening mammogram for malignant neoplasm of breast    Screening for colon cancer    Medicare welcome visit    Asymptomatic menopause    Screening for osteoporosis    Closed nondisplaced fracture of second metatarsal bone of left foot      Past Medical and Surgical History:     History reviewed  No pertinent past medical history  Past Surgical History:   Procedure Laterality Date    ABDOMINAL SURGERY        Family History:     History reviewed  No pertinent family history     Social History:     Social History     Socioeconomic History    Marital status: /Civil Union     Spouse name: None    Number of children: None    Years of education: None    Highest education level: None   Occupational History    None   Tobacco Use    Smoking status: Never Smoker    Smokeless tobacco: Never Used   Vaping Use    Vaping Use: Never used   Substance and Sexual Activity    Alcohol use: Yes     Comment: SOC    Drug use: Never    Sexual activity: None   Other Topics Concern    None   Social History Narrative    None     Social Determinants of Health     Financial Resource Strain:     Difficulty of Paying Living Expenses:    Food Insecurity:     Worried About Running Out of Food in the Last Year:     Ran Out of Food in the Last Year:    Transportation Needs:     Lack of Transportation (Medical):  Lack of Transportation (Non-Medical):    Physical Activity:     Days of Exercise per Week:     Minutes of Exercise per Session:    Stress:     Feeling of Stress :    Social Connections:     Frequency of Communication with Friends and Family:     Frequency of Social Gatherings with Friends and Family:     Attends Confucianist Services:     Active Member of Clubs or Organizations:     Attends Club or Organization Meetings:     Marital Status:    Intimate Partner Violence:     Fear of Current or Ex-Partner:     Emotionally Abused:     Physically Abused:     Sexually Abused:       Medications and Allergies:     Current Outpatient Medications   Medication Sig Dispense Refill    levothyroxine 50 mcg tablet Take 1 tablet (50 mcg total) by mouth daily 90 tablet 3    omeprazole (PriLOSEC) 40 MG capsule Take 40 mg by mouth daily       No current facility-administered medications for this visit       Allergies   Allergen Reactions    Amoxicillin Hives    Clavulanic Acid Hives    Pollen Extract Nasal Congestion    Other Hives and Rash     AMOXICIILLIN      Immunizations:     Immunization History   Administered Date(s) Administered    INFLUENZA 10/06/2017    Influenza, injectable, quadrivalent, preservative free 0 5 mL 10/24/2018, 10/31/2019    Influenza, seasonal, injectable 1955, 10/12/2014, 10/23/2015, 10/22/2016    SARS-CoV-2 / COVID-19 mRNA IM (Saecolin Gonzalez) 03/03/2021, 03/31/2021    Tdap 07/23/2015      Health Maintenance:         Topic Date Due    Breast Cancer Screening: Mammogram  09/25/2020    Colorectal Cancer Screening  09/17/2022    HIV Screening  Completed    Hepatitis C Screening  Completed         Topic Date Due    Pneumococcal Vaccine: 65+ Years (1 of 1 - PPSV23) Never done    Influenza Vaccine (1) 09/01/2021      Medicare Screening Tests and Risk Assessments:     Laxmi Baxter is here for her Welcome to Medicare visit  Health Risk Assessment:   Patient rates overall health as excellent  Patient feels that their physical health rating is same  Patient is satisfied with their life  Eyesight was rated as same  Hearing was rated as same  Patient feels that their emotional and mental health rating is same  Patients states they are never, rarely angry  Patient states they are never, rarely unusually tired/fatigued  Pain experienced in the last 7 days has been none  Patient states that she has experienced no weight loss or gain in last 6 months  Depression Screening:   PHQ-2 Score: 0      Fall Risk Screening: In the past year, patient has experienced: no history of falling in past year      Urinary Incontinence Screening:   Patient has not leaked urine accidently in the last six months  Home Safety:  Patient does not have trouble with stairs inside or outside of their home  Patient has working smoke alarms and has working carbon monoxide detector  Home safety hazards include: none  Nutrition:   Current diet is Regular  Medications:   Patient is not currently taking any over-the-counter supplements  Patient is able to manage medications       Activities of Daily Living (ADLs)/Instrumental Activities of Daily Living (IADLs):   Walk and transfer into and out of bed and chair?: Yes  Dress and groom yourself?: Yes    Bathe or shower yourself?: Yes    Feed yourself? Yes  Do your laundry/housekeeping?: Yes  Manage your money, pay your bills and track your expenses?: Yes  Make your own meals?: Yes    Do your own shopping?: Yes    Previous Hospitalizations:   Any hospitalizations or ED visits within the last 12 months?: Yes      Advance Care Planning:   Living will: Yes    Advanced directive: Yes    Provider agrees with end of life decisions: Yes      Cognitive Screening:   Provider or family/friend/caregiver concerned regarding cognition?: No    PREVENTIVE SCREENINGS      Cardiovascular Screening:    General: Screening Current      Diabetes Screening:     General: Screening Current      Colorectal Cancer Screening:     General: Screening Current      Breast Cancer Screening:     General: Screening Current      Cervical Cancer Screening:    General: Screening Not Indicated      Osteoporosis Screening:    General: Risks and Benefits Discussed and Screening Current    Due for: Bone Density Ultrasound      Abdominal Aortic Aneurysm (AAA) Screening:        General: Screening Not Indicated      Lung Cancer Screening:     General: Screening Not Indicated      Hepatitis C Screening:    General: Screening Current    Screening, Brief Intervention, and Referral to Treatment (SBIRT)    Screening  Typical number of drinks in a day: 0  Typical number of drinks in a week: 5  Interpretation: Low risk drinking behavior  Single Item Drug Screening:  How often have you used an illegal drug (including marijuana) or a prescription medication for non-medical reasons in the past year? never    Single Item Drug Screen Score: 0  Interpretation: Negative screen for possible drug use disorder    No exam data present     Physical Exam:     /62   Pulse 90   Temp (!) 97 °F (36 1 °C) (Temporal)   Resp 16   Ht 5' 3" (1 6 m)   Wt 48 1 kg (106 lb)   SpO2 99%   BMI 18 78 kg/m²     Physical Exam  Constitutional:       General: She is not in acute distress  Appearance: Normal appearance  She is well-developed and normal weight  HENT:      Head: Normocephalic and atraumatic  Right Ear: Tympanic membrane and external ear normal       Left Ear: Tympanic membrane and external ear normal       Nose: Nose normal       Mouth/Throat:      Pharynx: No oropharyngeal exudate  Eyes:      General: No scleral icterus  Right eye: No discharge  Left eye: No discharge  Conjunctiva/sclera: Conjunctivae normal       Pupils: Pupils are equal, round, and reactive to light  Neck:      Thyroid: No thyromegaly  Trachea: No tracheal deviation  Cardiovascular:      Rate and Rhythm: Normal rate and regular rhythm  Heart sounds: Murmur heard  No friction rub  No gallop  Pulmonary:      Effort: Pulmonary effort is normal  No respiratory distress  Breath sounds: Normal breath sounds  No wheezing or rales  Chest:      Chest wall: No tenderness  Abdominal:      General: Bowel sounds are normal  There is no distension  Palpations: Abdomen is soft  There is no mass  Tenderness: There is no abdominal tenderness  There is no guarding or rebound  Hernia: No hernia is present  Musculoskeletal:         General: No tenderness or deformity  Normal range of motion  Cervical back: Normal range of motion and neck supple  Right lower leg: No edema  Left lower leg: No edema  Lymphadenopathy:      Cervical: No cervical adenopathy  Skin:     General: Skin is warm and dry  Findings: No erythema or rash  Neurological:      Mental Status: She is alert and oriented to person, place, and time  Cranial Nerves: No cranial nerve deficit  Sensory: No sensory deficit  Motor: No weakness or abnormal muscle tone  Coordination: Coordination normal       Deep Tendon Reflexes: Reflexes normal    Psychiatric:         Behavior: Behavior normal          Thought Content:  Thought content normal          Judgment: Judgment normal           Ryan Lauerano MD

## 2021-09-07 NOTE — PATIENT INSTRUCTIONS
DISCUSSED HEALTH ISSUES  HEALTHY DIET AND EXERCISE  BW WILL BE OBTAINED  MAMMOGRAPHY   RECOMMEND CALCIUM 2192-2236 MG DAILY  VITAMIN D3  1000 IU DAILY  RV IN 1 YEAR FOR ANNUAL EXAM, SOONER IF NEEDED        Medicare Preventive Visit Patient Instructions  Thank you for completing your Welcome to Medicare Visit or Medicare Annual Wellness Visit today  Your next wellness visit will be due in one year (9/8/2022)  The screening/preventive services that you may require over the next 5-10 years are detailed below  Some tests may not apply to you based off risk factors and/or age  Screening tests ordered at today's visit but not completed yet may show as past due  Also, please note that scanned in results may not display below  Preventive Screenings:  Service Recommendations Previous Testing/Comments   Colorectal Cancer Screening  * Colonoscopy    * Fecal Occult Blood Test (FOBT)/Fecal Immunochemical Test (FIT)  * Fecal DNA/Cologuard Test  * Flexible Sigmoidoscopy Age: 54-65 years old   Colonoscopy: every 10 years (may be performed more frequently if at higher risk)  OR  FOBT/FIT: every 1 year  OR  Cologuard: every 3 years  OR  Sigmoidoscopy: every 5 years  Screening may be recommended earlier than age 48 if at higher risk for colorectal cancer  Also, an individualized decision between you and your healthcare provider will decide whether screening between the ages of 74-80 would be appropriate  Colonoscopy: 09/17/2019  FOBT/FIT: Not on file  Cologuard: Not on file  Sigmoidoscopy: Not on file    Screening Current     Breast Cancer Screening Age: 36 years old  Frequency: every 1-2 years  Not required if history of left and right mastectomy Mammogram: 09/25/2019    Screening Current   Cervical Cancer Screening Between the ages of 21-29, pap smear recommended once every 3 years  Between the ages of 33-67, can perform pap smear with HPV co-testing every 5 years     Recommendations may differ for women with a history of total hysterectomy, cervical cancer, or abnormal pap smears in past  Pap Smear: Not on file    Screening Not Indicated   Hepatitis C Screening Once for adults born between 1945 and 1965  More frequently in patients at high risk for Hepatitis C Hep C Antibody: 05/26/2021    Screening Current   Diabetes Screening 1-2 times per year if you're at risk for diabetes or have pre-diabetes Fasting glucose: No results in last 5 years   A1C: No results in last 5 years    Screening Current   Cholesterol Screening Once every 5 years if you don't have a lipid disorder  May order more often based on risk factors  Lipid panel: 05/26/2021    Screening Current     Other Preventive Screenings Covered by Medicare:  1  Abdominal Aortic Aneurysm (AAA) Screening: covered once if your at risk  You're considered to be at risk if you have a family history of AAA  2  Lung Cancer Screening: covers low dose CT scan once per year if you meet all of the following conditions: (1) Age 50-69; (2) No signs or symptoms of lung cancer; (3) Current smoker or have quit smoking within the last 15 years; (4) You have a tobacco smoking history of at least 30 pack years (packs per day multiplied by number of years you smoked); (5) You get a written order from a healthcare provider  3  Glaucoma Screening: covered annually if you're considered high risk: (1) You have diabetes OR (2) Family history of glaucoma OR (3)  aged 48 and older OR (3)  American aged 72 and older  3  Osteoporosis Screening: covered every 2 years if you meet one of the following conditions: (1) You're estrogen deficient and at risk for osteoporosis based off medical history and other findings; (2) Have a vertebral abnormality; (3) On glucocorticoid therapy for more than 3 months; (4) Have primary hyperparathyroidism; (5) On osteoporosis medications and need to assess response to drug therapy  · Last bone density test (DXA Scan): Not on file    5  HIV Screening: covered annually if you're between the age of 15-65  Also covered annually if you are younger than 13 and older than 72 with risk factors for HIV infection  For pregnant patients, it is covered up to 3 times per pregnancy  Immunizations:  Immunization Recommendations   Influenza Vaccine Annual influenza vaccination during flu season is recommended for all persons aged >= 6 months who do not have contraindications   Pneumococcal Vaccine (Prevnar and Pneumovax)  * Prevnar = PCV13  * Pneumovax = PPSV23   Adults 25-60 years old: 1-3 doses may be recommended based on certain risk factors  Adults 72 years old: Prevnar (PCV13) vaccine recommended followed by Pneumovax (PPSV23) vaccine  If already received PPSV23 since turning 65, then PCV13 recommended at least one year after PPSV23 dose  Hepatitis B Vaccine 3 dose series if at intermediate or high risk (ex: diabetes, end stage renal disease, liver disease)   Tetanus (Td) Vaccine - COST NOT COVERED BY MEDICARE PART B Following completion of primary series, a booster dose should be given every 10 years to maintain immunity against tetanus  Td may also be given as tetanus wound prophylaxis  Tdap Vaccine - COST NOT COVERED BY MEDICARE PART B Recommended at least once for all adults  For pregnant patients, recommended with each pregnancy  Shingles Vaccine (Shingrix) - COST NOT COVERED BY MEDICARE PART B  2 shot series recommended in those aged 48 and above     Health Maintenance Due:      Topic Date Due    Breast Cancer Screening: Mammogram  09/25/2020    Colorectal Cancer Screening  09/17/2022    HIV Screening  Completed    Hepatitis C Screening  Completed     Immunizations Due:      Topic Date Due    Pneumococcal Vaccine: 65+ Years (1 of 1 - PPSV23) Never done    Influenza Vaccine (1) 09/01/2021     Advance Directives   What are advance directives? Advance directives are legal documents that state your wishes and plans for medical care   These plans are made ahead of time in case you lose your ability to make decisions for yourself  Advance directives can apply to any medical decision, such as the treatments you want, and if you want to donate organs  What are the types of advance directives? There are many types of advance directives, and each state has rules about how to use them  You may choose a combination of any of the following:  · Living will: This is a written record of the treatment you want  You can also choose which treatments you do not want, which to limit, and which to stop at a certain time  This includes surgery, medicine, IV fluid, and tube feedings  · Durable power of  for healthcare [facility]): This is a written record that states who you want to make healthcare choices for you when you are unable to make them for yourself  This person, called a proxy, is usually a family member or a friend  You may choose more than 1 proxy  · Do not resuscitate (DNR) order:  A DNR order is used in case your heart stops beating or you stop breathing  It is a request not to have certain forms of treatment, such as CPR  A DNR order may be included in other types of advance directives  · Medical directive: This covers the care that you want if you are in a coma, near death, or unable to make decisions for yourself  You can list the treatments you want for each condition  Treatment may include pain medicine, surgery, blood transfusions, dialysis, IV or tube feedings, and a ventilator (breathing machine)  · Values history: This document has questions about your views, beliefs, and how you feel and think about life  This information can help others choose the care that you would choose  Why are advance directives important? An advance directive helps you control your care  Although spoken wishes may be used, it is better to have your wishes written down  Spoken wishes can be misunderstood, or not followed   Treatments may be given even if you do not want them  An advance directive may make it easier for your family to make difficult choices about your care  © Copyright [facility] 2018 Information is for End User's use only and may not be sold, redistributed or otherwise used for commercial purposes   All illustrations and images included in CareNotes® are the copyrighted property of A D A M , Inc  or [address] [place] [address]

## 2021-09-08 DIAGNOSIS — E03.9 ACQUIRED HYPOTHYROIDISM: Primary | ICD-10-CM

## 2021-09-08 LAB
ALBUMIN SERPL-MCNC: 4.6 G/DL (ref 3.8–4.8)
ALBUMIN/GLOB SERPL: 2.2 {RATIO} (ref 1.2–2.2)
ALP SERPL-CCNC: 111 IU/L (ref 48–121)
ALT SERPL-CCNC: 16 IU/L (ref 0–32)
AST SERPL-CCNC: 24 IU/L (ref 0–40)
BASOPHILS # BLD AUTO: 0.1 X10E3/UL (ref 0–0.2)
BASOPHILS NFR BLD AUTO: 1 %
BILIRUB SERPL-MCNC: <0.2 MG/DL (ref 0–1.2)
BUN SERPL-MCNC: 29 MG/DL (ref 8–27)
BUN/CREAT SERPL: 29 (ref 12–28)
CALCIUM SERPL-MCNC: 10.1 MG/DL (ref 8.7–10.3)
CHLORIDE SERPL-SCNC: 102 MMOL/L (ref 96–106)
CO2 SERPL-SCNC: 23 MMOL/L (ref 20–29)
CREAT SERPL-MCNC: 0.99 MG/DL (ref 0.57–1)
EOSINOPHIL # BLD AUTO: 0 X10E3/UL (ref 0–0.4)
EOSINOPHIL NFR BLD AUTO: 0 %
ERYTHROCYTE [DISTWIDTH] IN BLOOD BY AUTOMATED COUNT: 13.8 % (ref 11.7–15.4)
GLOBULIN SER-MCNC: 2.1 G/DL (ref 1.5–4.5)
GLUCOSE SERPL-MCNC: 144 MG/DL (ref 65–99)
HCT VFR BLD AUTO: 34.6 % (ref 34–46.6)
HGB BLD-MCNC: 11 G/DL (ref 11.1–15.9)
IMM GRANULOCYTES # BLD: 0 X10E3/UL (ref 0–0.1)
IMM GRANULOCYTES NFR BLD: 0 %
IRON SERPL-MCNC: 42 UG/DL (ref 27–139)
LYMPHOCYTES # BLD AUTO: 1.8 X10E3/UL (ref 0.7–3.1)
LYMPHOCYTES NFR BLD AUTO: 20 %
MCH RBC QN AUTO: 28.1 PG (ref 26.6–33)
MCHC RBC AUTO-ENTMCNC: 31.8 G/DL (ref 31.5–35.7)
MCV RBC AUTO: 88 FL (ref 79–97)
MONOCYTES # BLD AUTO: 0.5 X10E3/UL (ref 0.1–0.9)
MONOCYTES NFR BLD AUTO: 5 %
NEUTROPHILS # BLD AUTO: 6.5 X10E3/UL (ref 1.4–7)
NEUTROPHILS NFR BLD AUTO: 74 %
PLATELET # BLD AUTO: 404 X10E3/UL (ref 150–450)
POTASSIUM SERPL-SCNC: 4.9 MMOL/L (ref 3.5–5.2)
PROT SERPL-MCNC: 6.7 G/DL (ref 6–8.5)
RBC # BLD AUTO: 3.92 X10E6/UL (ref 3.77–5.28)
SL AMB EGFR AFRICAN AMERICAN: 69 ML/MIN/1.73
SL AMB EGFR NON AFRICAN AMERICAN: 60 ML/MIN/1.73
SODIUM SERPL-SCNC: 137 MMOL/L (ref 134–144)
TSH SERPL DL<=0.005 MIU/L-ACNC: 2.27 UIU/ML (ref 0.45–4.5)
WBC # BLD AUTO: 8.9 X10E3/UL (ref 3.4–10.8)

## 2021-09-08 RX ORDER — AMLODIPINE BESYLATE AND BENAZEPRIL HYDROCHLORIDE 10; 40 MG/1; MG/1
1 CAPSULE ORAL DAILY
Qty: 90 CAPSULE | Refills: 3 | Status: SHIPPED | OUTPATIENT
Start: 2021-09-08 | End: 2022-07-28

## 2021-09-08 RX ORDER — AMLODIPINE BESYLATE AND BENAZEPRIL HYDROCHLORIDE 10; 40 MG/1; MG/1
1 CAPSULE ORAL DAILY
COMMUNITY
End: 2021-09-08 | Stop reason: SDUPTHER

## 2021-09-08 RX ORDER — LEVOTHYROXINE SODIUM 0.05 MG/1
50 TABLET ORAL DAILY
Qty: 90 TABLET | Refills: 3 | Status: SHIPPED | OUTPATIENT
Start: 2021-09-08 | End: 2022-07-28

## 2021-09-20 ENCOUNTER — APPOINTMENT (OUTPATIENT)
Dept: RADIOLOGY | Facility: CLINIC | Age: 66
End: 2021-09-20
Payer: COMMERCIAL

## 2021-09-20 ENCOUNTER — TELEPHONE (OUTPATIENT)
Dept: FAMILY MEDICINE CLINIC | Facility: CLINIC | Age: 66
End: 2021-09-20

## 2021-09-20 DIAGNOSIS — M79.671 RIGHT FOOT PAIN: ICD-10-CM

## 2021-09-20 DIAGNOSIS — Z87.312 HISTORY OF STRESS FRACTURE: ICD-10-CM

## 2021-09-20 DIAGNOSIS — M79.671 RIGHT FOOT PAIN: Primary | ICD-10-CM

## 2021-09-20 PROCEDURE — 73630 X-RAY EXAM OF FOOT: CPT

## 2021-09-20 NOTE — TELEPHONE ENCOUNTER
Marie Regalado states she has been seeing you for a stress fx in L foot, but now she feels the same in the Right foot  Would you be willing to order an xray on her Right foot?   Please advise [phone]

## 2022-03-01 ENCOUNTER — RA CDI HCC (OUTPATIENT)
Dept: OTHER | Facility: HOSPITAL | Age: 67
End: 2022-03-01

## 2022-03-01 NOTE — PROGRESS NOTES
[facility]  coding opportunities       Chart reviewed, no opportunity found: CHART REVIEWED, NO OPPORTUNITY FOUND                        Patients insurance company: [employer]

## 2022-03-07 RX ORDER — FERRIC CARBOXYMALTOSE INJECTION 50 MG/ML
INJECTION, SOLUTION INTRAVENOUS
COMMUNITY
Start: 2022-02-07

## 2022-03-07 RX ORDER — OMEPRAZOLE 20 MG/1
CAPSULE, DELAYED RELEASE ORAL
COMMUNITY
Start: 2022-02-08

## 2022-03-08 ENCOUNTER — OFFICE VISIT (OUTPATIENT)
Dept: FAMILY MEDICINE CLINIC | Facility: CLINIC | Age: 67
End: 2022-03-08
Payer: COMMERCIAL

## 2022-03-08 ENCOUNTER — TELEPHONE (OUTPATIENT)
Dept: ADMINISTRATIVE | Facility: OTHER | Age: 67
End: 2022-03-08

## 2022-03-08 VITALS
BODY MASS INDEX: 19.49 KG/M2 | SYSTOLIC BLOOD PRESSURE: 138 MMHG | DIASTOLIC BLOOD PRESSURE: 60 MMHG | RESPIRATION RATE: 12 BRPM | WEIGHT: 110 LBS | HEART RATE: 100 BPM | HEIGHT: 63 IN | TEMPERATURE: 96.9 F | OXYGEN SATURATION: 100 %

## 2022-03-08 DIAGNOSIS — I10 PRIMARY HYPERTENSION: Primary | ICD-10-CM

## 2022-03-08 DIAGNOSIS — E03.9 HYPOTHYROIDISM, UNSPECIFIED TYPE: ICD-10-CM

## 2022-03-08 DIAGNOSIS — D50.0 IRON DEFICIENCY ANEMIA DUE TO CHRONIC BLOOD LOSS: ICD-10-CM

## 2022-03-08 DIAGNOSIS — K21.9 GERD WITHOUT ESOPHAGITIS: ICD-10-CM

## 2022-03-08 PROCEDURE — 1036F TOBACCO NON-USER: CPT | Performed by: FAMILY MEDICINE

## 2022-03-08 PROCEDURE — 3008F BODY MASS INDEX DOCD: CPT | Performed by: FAMILY MEDICINE

## 2022-03-08 PROCEDURE — 1160F RVW MEDS BY RX/DR IN RCRD: CPT | Performed by: FAMILY MEDICINE

## 2022-03-08 PROCEDURE — 3075F SYST BP GE 130 - 139MM HG: CPT | Performed by: FAMILY MEDICINE

## 2022-03-08 PROCEDURE — 3078F DIAST BP <80 MM HG: CPT | Performed by: FAMILY MEDICINE

## 2022-03-08 PROCEDURE — 99214 OFFICE O/P EST MOD 30 MIN: CPT | Performed by: FAMILY MEDICINE

## 2022-03-08 RX ORDER — DIPHENOXYLATE HYDROCHLORIDE AND ATROPINE SULFATE 2.5; .025 MG/1; MG/1
1 TABLET ORAL DAILY
COMMUNITY

## 2022-03-08 NOTE — PATIENT INSTRUCTIONS
CONTINUE CURRENT TREATMENT PLAN  MONITOR DIETARY SODIUM, CHOL INTAKE  ENCOURAGE PHYSICAL ACTIVITY      RV 6 M, SOONER PRN            Recent Results (from the past 672 hour(s))   CBC and differential    Collection Time: 03/03/22  1:49 PM   Result Value Ref Range    White Blood Cell Count 8 8 3 4 - 10 8 x10E3/uL    Red Blood Cell Count 4 70 3 77 - 5 28 x10E6/uL    Hemoglobin 12 1 11 1 - 15 9 g/dL    HCT 39 1 34 0 - 46 6 %    MCV 83 79 - 97 fL    MCH 25 7 (L) 26 6 - 33 0 pg    MCHC 30 9 (L) 31 5 - 35 7 g/dL    RDW 21 6 (H) 11 7 - 15 4 %    Platelet Count 904 727 - 450 x10E3/uL    Neutrophils 63 Not Estab  %    Lymphocytes 27 Not Estab  %    Monocytes 8 Not Estab  %    Eosinophils 0 Not Estab  %    Basophils PCT 1 Not Estab  %    Neutrophils (Absolute) 5 6 1 4 - 7 0 x10E3/uL    Lymphocytes (Absolute) 2 4 0 7 - 3 1 x10E3/uL    Monocytes (Absolute) 0 7 0 1 - 0 9 x10E3/uL    Eosinophils (Absolute) 0 0 0 0 - 0 4 x10E3/uL    Basophils ABS 0 1 0 0 - 0 2 x10E3/uL    Immature Granulocytes 1 Not Estab  %    Immature Granulocytes (Absolute) 0 0 0 0 - 0 1 x10E3/uL   Comprehensive metabolic panel    Collection Time: 03/03/22  1:49 PM   Result Value Ref Range    Glucose, Random 96 65 - 99 mg/dL    BUN 27 8 - 27 mg/dL    Creatinine 0 94 0 57 - 1 00 mg/dL    eGFR 67 >59 mL/min/1 73    SL AMB BUN/CREATININE RATIO 29 (H) 12 - 28    Sodium 138 134 - 144 mmol/L    Potassium 4 9 3 5 - 5 2 mmol/L    Chloride 102 96 - 106 mmol/L    CO2 22 20 - 29 mmol/L    CALCIUM 9 5 8 7 - 10 3 mg/dL    Protein, Total 7 3 6 0 - 8 5 g/dL    Albumin 5 0 (H) 3 8 - 4 8 g/dL    Globulin, Total 2 3 1 5 - 4 5 g/dL    Albumin/Globulin Ratio 2 2 1 2 - 2 2    TOTAL BILIRUBIN <0 2 0 0 - 1 2 mg/dL    Alk Phos Isoenzymes 94 44 - 121 IU/L    AST 35 0 - 40 IU/L    ALT 25 0 - 32 IU/L   TSH, 3rd generation    Collection Time: 03/03/22  1:49 PM   Result Value Ref Range    TSH 3 010 0 450 - 4 500 uIU/mL

## 2022-03-08 NOTE — TELEPHONE ENCOUNTER
Upon review of the In Basket request and the patient's chart, initial outreach has been made via fax, please see Contacts section for details       Thank you  Cassi Silva

## 2022-03-08 NOTE — LETTER
MARY KAY      Current Outpatient Medications:     ferric carboxymaltose (Injectafer) injection, infuse 750 mg by intravenous route days 1 & 8; may repeat in 4-6 weeks, Disp: , Rfl:     amLODIPine-benazepril (LOTREL) 10-40 MG per capsule, Take 1 capsule by mouth daily, Disp: 90 capsule, Rfl: 3    levothyroxine 50 mcg tablet, Take 1 tablet (50 mcg total) by mouth daily, Disp: 90 tablet, Rfl: 3    omeprazole (PriLOSEC) 20 mg delayed release capsule, , Disp: , Rfl:     omeprazole (PriLOSEC) 40 MG capsule, Take 40 mg by mouth daily, Disp: , Rfl:       Recent Results (from the past 672 hour(s))   CBC and differential    Collection Time: 03/03/22  1:49 PM   Result Value Ref Range    White Blood Cell Count 8 8 3 4 - 10 8 x10E3/uL    Red Blood Cell Count 4 70 3 77 - 5 28 x10E6/uL    Hemoglobin 12 1 11 1 - 15 9 g/dL    HCT 39 1 34 0 - 46 6 %    MCV 83 79 - 97 fL    MCH 25 7 (L) 26 6 - 33 0 pg    MCHC 30 9 (L) 31 5 - 35 7 g/dL    RDW 21 6 (H) 11 7 - 15 4 %    Platelet Count 258 574 - 450 x10E3/uL    Neutrophils 63 Not Estab  %    Lymphocytes 27 Not Estab  %    Monocytes 8 Not Estab  %    Eosinophils 0 Not Estab  %    Basophils PCT 1 Not Estab  %    Neutrophils (Absolute) 5 6 1 4 - 7 0 x10E3/uL    Lymphocytes (Absolute) 2 4 0 7 - 3 1 x10E3/uL    Monocytes (Absolute) 0 7 0 1 - 0 9 x10E3/uL    Eosinophils (Absolute) 0 0 0 0 - 0 4 x10E3/uL    Basophils ABS 0 1 0 0 - 0 2 x10E3/uL    Immature Granulocytes 1 Not Estab  %    Immature Granulocytes (Absolute) 0 0 0 0 - 0 1 x10E3/uL   Comprehensive metabolic panel    Collection Time: 03/03/22  1:49 PM   Result Value Ref Range    Glucose, Random 96 65 - 99 mg/dL    BUN 27 8 - 27 mg/dL    Creatinine 0 94 0 57 - 1 00 mg/dL    eGFR 67 >59 mL/min/1 73    SL AMB BUN/CREATININE RATIO 29 (H) 12 - 28    Sodium 138 134 - 144 mmol/L    Potassium 4 9 3 5 - 5 2 mmol/L    Chloride 102 96 - 106 mmol/L    CO2 22 20 - 29 mmol/L    CALCIUM 9 5 8 7 - 10 3 mg/dL    Protein, Total 7 3 6 0 - 8 5 g/dL    Albumin 5 0 (H) 3 8 - 4 8 g/dL    Globulin, Total 2 3 1 5 - 4 5 g/dL    Albumin/Globulin Ratio 2 2 1 2 - 2 2    TOTAL BILIRUBIN <0 2 0 0 - 1 2 mg/dL    Alk Phos Isoenzymes 94 44 - 121 IU/L    AST 35 0 - 40 IU/L    ALT 25 0 - 32 IU/L   TSH, 3rd generation    Collection Time: 03/03/22  1:49 PM   Result Value Ref Range    TSH 3 010 0 450 - 4 500 uIU/mL

## 2022-03-08 NOTE — TELEPHONE ENCOUNTER
----- Message from Jaison Oden sent at 3/8/2022  9:32 AM EST -----  Regarding: care gap request - flu shot  03/08/22 9:32 AM    Hello, our patient attached above has had Immunization(s) completed/performed  Please assist in updating the patient chart by making an [facility] in [place], [place]  The date of service is 2021      Thank you,  Jaison Oden  [address]

## 2022-03-08 NOTE — LETTER
Vaccination Request Form: Influenza      Date Requested: 22  Patient: Arnoldo Suazo  Patient : [id number]   Referring Provider: Sherrie Grimm MD       The above patient has informed us that they have had their   most recent Influenza administered at your facility  Please   complete this form and attach all corresponding documentation  Date of Vaccine(s) Given  ______________________________    Lot Number(s) _______________________________________    Manufacture(s) ______________________________________    Dose Amount (s) _____________________________________    Expiration Date(s) ____________________________________    Comments __________________________________________________________  ____________________________________________________________________  ____________________________________________________________________  ____________________________________________________________________    Administering Facility  ________________________________________________    Vaccine Administered By (print name) ___________________________________      Form Completed By (print name) _______________________________________      Signature ___________________________________________________________      These reports are needed for  compliance  Please fax this completed form and a copy of the Vaccine Document(s) to our office located at [place] [address] as soon as possible to [phone] jensen Castillo: Phone [phone]    We thank you for your assistance in treating our mutual patient

## 2022-03-09 NOTE — TELEPHONE ENCOUNTER
Upon review of the In Basket request we were able to locate, review, and update the patient chart as requested for Immunization(s) Influenza vaccine  Any additional questions or concerns should be emailed to the Practice Liaisons via [email or web address] email, please do not reply via In Basket      Thank you  Stefano Akers

## 2022-07-27 DIAGNOSIS — E03.9 ACQUIRED HYPOTHYROIDISM: ICD-10-CM

## 2022-07-28 RX ORDER — AMLODIPINE BESYLATE AND BENAZEPRIL HYDROCHLORIDE 10; 40 MG/1; MG/1
1 CAPSULE ORAL DAILY
Qty: 90 CAPSULE | Refills: 3 | Status: SHIPPED | OUTPATIENT
Start: 2022-07-28

## 2022-07-28 RX ORDER — LEVOTHYROXINE SODIUM 0.05 MG/1
TABLET ORAL
Qty: 90 TABLET | Refills: 3 | Status: SHIPPED | OUTPATIENT
Start: 2022-07-28

## 2022-08-01 ENCOUNTER — TELEPHONE (OUTPATIENT)
Dept: FAMILY MEDICINE CLINIC | Facility: CLINIC | Age: 67
End: 2022-08-01

## 2022-08-03 DIAGNOSIS — K21.9 GERD WITHOUT ESOPHAGITIS: ICD-10-CM

## 2022-08-03 DIAGNOSIS — I10 PRIMARY HYPERTENSION: Primary | ICD-10-CM

## 2022-08-03 DIAGNOSIS — E78.2 MIXED HYPERLIPIDEMIA: ICD-10-CM

## 2022-08-03 DIAGNOSIS — E03.9 HYPOTHYROIDISM, UNSPECIFIED TYPE: ICD-10-CM

## 2022-08-03 DIAGNOSIS — D50.0 IRON DEFICIENCY ANEMIA DUE TO CHRONIC BLOOD LOSS: ICD-10-CM

## 2022-08-29 LAB
BASOPHILS # BLD AUTO: 0.1 X10E3/UL (ref 0–0.2)
BASOPHILS NFR BLD AUTO: 1 %
EOSINOPHIL # BLD AUTO: 0 X10E3/UL (ref 0–0.4)
EOSINOPHIL NFR BLD AUTO: 0 %
ERYTHROCYTE [DISTWIDTH] IN BLOOD BY AUTOMATED COUNT: 14.9 % (ref 11.7–15.4)
HCT VFR BLD AUTO: 35.9 % (ref 34–46.6)
HGB BLD-MCNC: 11.1 G/DL (ref 11.1–15.9)
IMM GRANULOCYTES # BLD: 0.1 X10E3/UL (ref 0–0.1)
IMM GRANULOCYTES NFR BLD: 1 %
LYMPHOCYTES # BLD AUTO: 2 X10E3/UL (ref 0.7–3.1)
LYMPHOCYTES NFR BLD AUTO: 21 %
MCH RBC QN AUTO: 26.4 PG (ref 26.6–33)
MCHC RBC AUTO-ENTMCNC: 30.9 G/DL (ref 31.5–35.7)
MCV RBC AUTO: 85 FL (ref 79–97)
MONOCYTES # BLD AUTO: 0.6 X10E3/UL (ref 0.1–0.9)
MONOCYTES NFR BLD AUTO: 7 %
NEUTROPHILS # BLD AUTO: 6.6 X10E3/UL (ref 1.4–7)
NEUTROPHILS NFR BLD AUTO: 70 %
PLATELET # BLD AUTO: 510 X10E3/UL (ref 150–450)
RBC # BLD AUTO: 4.21 X10E6/UL (ref 3.77–5.28)
WBC # BLD AUTO: 9.3 X10E3/UL (ref 3.4–10.8)

## 2022-08-30 LAB
ALBUMIN SERPL-MCNC: 5.1 G/DL (ref 3.8–4.8)
ALBUMIN/GLOB SERPL: 2.4 {RATIO} (ref 1.2–2.2)
ALP SERPL-CCNC: 93 IU/L (ref 44–121)
ALT SERPL-CCNC: 15 IU/L (ref 0–32)
AST SERPL-CCNC: 30 IU/L (ref 0–40)
BILIRUB SERPL-MCNC: <0.2 MG/DL (ref 0–1.2)
BUN SERPL-MCNC: 32 MG/DL (ref 8–27)
BUN/CREAT SERPL: 27 (ref 12–28)
CALCIUM SERPL-MCNC: 10.6 MG/DL (ref 8.7–10.3)
CHLORIDE SERPL-SCNC: 99 MMOL/L (ref 96–106)
CHOLEST SERPL-MCNC: 253 MG/DL (ref 100–199)
CHOLEST/HDLC SERPL: 3.4 RATIO (ref 0–4.4)
CO2 SERPL-SCNC: 23 MMOL/L (ref 20–29)
CREAT SERPL-MCNC: 1.19 MG/DL (ref 0.57–1)
EGFR: 50 ML/MIN/1.73
GLOBULIN SER-MCNC: 2.1 G/DL (ref 1.5–4.5)
GLUCOSE SERPL-MCNC: 98 MG/DL (ref 65–99)
HDLC SERPL-MCNC: 74 MG/DL
IRON SERPL-MCNC: 32 UG/DL (ref 27–139)
LDLC SERPL CALC-MCNC: 149 MG/DL (ref 0–99)
POTASSIUM SERPL-SCNC: 5.7 MMOL/L (ref 3.5–5.2)
PROT SERPL-MCNC: 7.2 G/DL (ref 6–8.5)
SL AMB VLDL CHOLESTEROL CALC: 30 MG/DL (ref 5–40)
SODIUM SERPL-SCNC: 138 MMOL/L (ref 134–144)
TRIGL SERPL-MCNC: 167 MG/DL (ref 0–149)
TSH SERPL DL<=0.005 MIU/L-ACNC: 3.23 UIU/ML (ref 0.45–4.5)
URATE SERPL-MCNC: 5.8 MG/DL (ref 3–7.2)

## 2022-09-06 ENCOUNTER — RA CDI HCC (OUTPATIENT)
Dept: OTHER | Facility: HOSPITAL | Age: 67
End: 2022-09-06

## 2022-09-06 NOTE — PROGRESS NOTES
[address]  coding opportunities       Chart reviewed, no opportunity found: CHART REVIEWED, NO OPPORTUNITY FOUND        Patients Insurance     Medicare Insurance: [employer]

## 2022-09-08 PROBLEM — M85.89 OSTEOPENIA OF MULTIPLE SITES: Status: ACTIVE | Noted: 2022-09-08

## 2022-09-08 PROBLEM — Z00.00 MEDICARE WELCOME VISIT: Status: RESOLVED | Noted: 2021-09-07 | Resolved: 2022-09-08

## 2022-09-08 NOTE — PATIENT INSTRUCTIONS
DISCUSSED HEALTH ISSUES  HEALTHY DIET AND EXERCISE  BW WILL BE REVIEWED  MAMMOGRAPHY   RECOMMEND CALCIUM 9225-1360 MG DAILY  VITAMIN D3  1000 IU DAILY  RV IN 1 YEAR FOR ANNUAL EXAM, SOONER IF NEEDED      RV 6M    Recent Results (from the past 672 hour(s))   CBC and differential    Collection Time: 08/29/22 12:35 PM   Result Value Ref Range    White Blood Cell Count 9 3 3 4 - 10 8 x10E3/uL    Red Blood Cell Count 4 21 3 77 - 5 28 x10E6/uL    Hemoglobin 11 1 11 1 - 15 9 g/dL    HCT 35 9 34 0 - 46 6 %    MCV 85 79 - 97 fL    MCH 26 4 (L) 26 6 - 33 0 pg    MCHC 30 9 (L) 31 5 - 35 7 g/dL    RDW 14 9 11 7 - 15 4 %    Platelet Count 454 (H) 150 - 450 x10E3/uL    Neutrophils 70 Not Estab  %    Lymphocytes 21 Not Estab  %    Monocytes 7 Not Estab  %    Eosinophils 0 Not Estab  %    Basophils PCT 1 Not Estab  %    Neutrophils (Absolute) 6 6 1 4 - 7 0 x10E3/uL    Lymphocytes (Absolute) 2 0 0 7 - 3 1 x10E3/uL    Monocytes (Absolute) 0 6 0 1 - 0 9 x10E3/uL    Eosinophils (Absolute) 0 0 0 0 - 0 4 x10E3/uL    Basophils ABS 0 1 0 0 - 0 2 x10E3/uL    Immature Granulocytes 1 Not Estab  %    Immature Granulocytes (Absolute) 0 1 0 0 - 0 1 x10E3/uL   Comprehensive metabolic panel    Collection Time: 08/29/22 12:35 PM   Result Value Ref Range    Glucose, Random 98 65 - 99 mg/dL    BUN 32 (H) 8 - 27 mg/dL    Creatinine 1 19 (H) 0 57 - 1 00 mg/dL    eGFR 50 (L) >59 mL/min/1 73    SL AMB BUN/CREATININE RATIO 27 12 - 28    Sodium 138 134 - 144 mmol/L    Potassium 5 7 (H) 3 5 - 5 2 mmol/L    Chloride 99 96 - 106 mmol/L    CO2 23 20 - 29 mmol/L    CALCIUM 10 6 (H) 8 7 - 10 3 mg/dL    Protein, Total 7 2 6 0 - 8 5 g/dL    Albumin 5 1 (H) 3 8 - 4 8 g/dL    Globulin, Total 2 1 1 5 - 4 5 g/dL    Albumin/Globulin Ratio 2 4 (H) 1 2 - 2 2    TOTAL BILIRUBIN <0 2 0 0 - 1 2 mg/dL    Alk Phos Isoenzymes 93 44 - 121 IU/L    AST 30 0 - 40 IU/L    ALT 15 0 - 32 IU/L   Lipid panel    Collection Time: 08/29/22 12:35 PM   Result Value Ref Range    Cholesterol, Total 253 (H) 100 - 199 mg/dL    Triglycerides 167 (H) 0 - 149 mg/dL    HDL 74 >39 mg/dL    VLDL Cholesterol Calculated 30 5 - 40 mg/dL    LDL Calculated 149 (H) 0 - 99 mg/dL    T  Chol/HDL Ratio 3 4 0 0 - 4 4 ratio   TSH, 3rd generation    Collection Time: 08/29/22 12:35 PM   Result Value Ref Range    TSH 3 230 0 450 - 4 500 uIU/mL   Uric acid    Collection Time: 08/29/22 12:35 PM   Result Value Ref Range    Uric Acid 5 8 3 0 - 7 2 mg/dL   Iron    Collection Time: 08/29/22 12:35 PM   Result Value Ref Range    Iron, Serum 32 27 - 139 ug/dL       Medicare Preventive Visit Patient Instructions  Thank you for completing your Welcome to Medicare Visit or Medicare Annual Wellness Visit today  Your next wellness visit will be due in one year (9/10/2023)  The screening/preventive services that you may require over the next 5-10 years are detailed below  Some tests may not apply to you based off risk factors and/or age  Screening tests ordered at today's visit but not completed yet may show as past due  Also, please note that scanned in results may not display below  Preventive Screenings:  Service Recommendations Previous Testing/Comments   Colorectal Cancer Screening  * Colonoscopy    * Fecal Occult Blood Test (FOBT)/Fecal Immunochemical Test (FIT)  * Fecal DNA/Cologuard Test  * Flexible Sigmoidoscopy Age: 39-70 years old   Colonoscopy: every 10 years (may be performed more frequently if at higher risk)  OR  FOBT/FIT: every 1 year  OR  Cologuard: every 3 years  OR  Sigmoidoscopy: every 5 years  Screening may be recommended earlier than age 39 if at higher risk for colorectal cancer  Also, an individualized decision between you and your healthcare provider will decide whether screening between the ages of 74-80 would be appropriate   Colonoscopy: 09/17/2019  FOBT/FIT: Not on file  Cologuard: Not on file  Sigmoidoscopy: Not on file    Screening Current     Breast Cancer Screening Age: 36 years old  Frequency: every 1-2 years  Not required if history of left and right mastectomy Mammogram: 10/18/2021    Screening Current   Cervical Cancer Screening Between the ages of 21-29, pap smear recommended once every 3 years  Between the ages of 33-67, can perform pap smear with HPV co-testing every 5 years  Recommendations may differ for women with a history of total hysterectomy, cervical cancer, or abnormal pap smears in past  Pap Smear: Not on file    Screening Not Indicated   Hepatitis C Screening Once for adults born between 1945 and 1965  More frequently in patients at high risk for Hepatitis C Hep C Antibody: 05/26/2021    Screening Current   Diabetes Screening 1-2 times per year if you're at risk for diabetes or have pre-diabetes Fasting glucose: No results in last 5 years (No results in last 5 years)  A1C: No results in last 5 years (No results in last 5 years)  Screening Current   Cholesterol Screening Once every 5 years if you don't have a lipid disorder  May order more often based on risk factors  Lipid panel: 08/29/2022    Screening Not Indicated  History Lipid Disorder     Other Preventive Screenings Covered by Medicare:  1  Abdominal Aortic Aneurysm (AAA) Screening: covered once if your at risk  You're considered to be at risk if you have a family history of AAA  2  Lung Cancer Screening: covers low dose CT scan once per year if you meet all of the following conditions: (1) Age 50-69; (2) No signs or symptoms of lung cancer; (3) Current smoker or have quit smoking within the last 15 years; (4) You have a tobacco smoking history of at least 20 pack years (packs per day multiplied by number of years you smoked); (5) You get a written order from a healthcare provider  3  Glaucoma Screening: covered annually if you're considered high risk: (1) You have diabetes OR (2) Family history of glaucoma OR (3)  aged 48 and older OR (3)  American aged 72 and older  3   Osteoporosis Screening: covered every 2 years if you meet one of the following conditions: (1) You're estrogen deficient and at risk for osteoporosis based off medical history and other findings; (2) Have a vertebral abnormality; (3) On glucocorticoid therapy for more than 3 months; (4) Have primary hyperparathyroidism; (5) On osteoporosis medications and need to assess response to drug therapy  · Last bone density test (DXA Scan): 10/18/2021   5  HIV Screening: covered annually if you're between the age of 15-65  Also covered annually if you are younger than 13 and older than 72 with risk factors for HIV infection  For pregnant patients, it is covered up to 3 times per pregnancy  Immunizations:  Immunization Recommendations   Influenza Vaccine Annual influenza vaccination during flu season is recommended for all persons aged >= 6 months who do not have contraindications   Pneumococcal Vaccine   * Pneumococcal conjugate vaccine = PCV13 (Prevnar 13), PCV15 (Vaxneuvance), PCV20 (Prevnar 20)  * Pneumococcal polysaccharide vaccine = PPSV23 (Pneumovax) Adults 25-60 years old: 1-3 doses may be recommended based on certain risk factors  Adults 72 years old: 1-2 doses may be recommended based off what pneumonia vaccine you previously received   Hepatitis B Vaccine 3 dose series if at intermediate or high risk (ex: diabetes, end stage renal disease, liver disease)   Tetanus (Td) Vaccine - COST NOT COVERED BY MEDICARE PART B Following completion of primary series, a booster dose should be given every 10 years to maintain immunity against tetanus  Td may also be given as tetanus wound prophylaxis  Tdap Vaccine - COST NOT COVERED BY MEDICARE PART B Recommended at least once for all adults  For pregnant patients, recommended with each pregnancy     Shingles Vaccine (Shingrix) - COST NOT COVERED BY MEDICARE PART B  2 shot series recommended in those aged 48 and above     Health Maintenance Due:      Topic Date Due    Colorectal Cancer Screening  09/17/2022    Breast Cancer Screening: Mammogram  10/18/2022    DXA SCAN  10/18/2026    Hepatitis C Screening  Completed     Immunizations Due:      Topic Date Due    Pneumococcal Vaccine: 65+ Years (1 - PCV) Never done    COVID-19 Vaccine (3 - Booster for Moderna series) 08/31/2021    Influenza Vaccine (1) 09/01/2022     Advance Directives   What are advance directives? Advance directives are legal documents that state your wishes and plans for medical care  These plans are made ahead of time in case you lose your ability to make decisions for yourself  Advance directives can apply to any medical decision, such as the treatments you want, and if you want to donate organs  What are the types of advance directives? There are many types of advance directives, and each state has rules about how to use them  You may choose a combination of any of the following:  · Living will: This is a written record of the treatment you want  You can also choose which treatments you do not want, which to limit, and which to stop at a certain time  This includes surgery, medicine, IV fluid, and tube feedings  · Durable power of  for healthcare [place] [facility]): This is a written record that states who you want to make healthcare choices for you when you are unable to make them for yourself  This person, called a proxy, is usually a family member or a friend  You may choose more than 1 proxy  · Do not resuscitate (DNR) order:  A DNR order is used in case your heart stops beating or you stop breathing  It is a request not to have certain forms of treatment, such as CPR  A DNR order may be included in other types of advance directives  · Medical directive: This covers the care that you want if you are in a coma, near death, or unable to make decisions for yourself  You can list the treatments you want for each condition   Treatment may include pain medicine, surgery, blood transfusions, dialysis, IV or tube feedings, and a ventilator (breathing machine)  · Values history: This document has questions about your views, beliefs, and how you feel and think about life  This information can help others choose the care that you would choose  Why are advance directives important? An advance directive helps you control your care  Although spoken wishes may be used, it is better to have your wishes written down  Spoken wishes can be misunderstood, or not followed  Treatments may be given even if you do not want them  An advance directive may make it easier for your family to make difficult choices about your care  © Copyright [employer] 2018 Information is for End User's use only and may not be sold, redistributed or otherwise used for commercial purposes   All illustrations and images included in CareNotes® are the copyrighted property of A D A M , [employer]  or [address] [place] [address]

## 2022-09-09 ENCOUNTER — OFFICE VISIT (OUTPATIENT)
Dept: FAMILY MEDICINE CLINIC | Facility: CLINIC | Age: 67
End: 2022-09-09
Payer: COMMERCIAL

## 2022-09-09 VITALS
OXYGEN SATURATION: 98 % | HEIGHT: 63 IN | RESPIRATION RATE: 12 BRPM | HEART RATE: 103 BPM | SYSTOLIC BLOOD PRESSURE: 158 MMHG | TEMPERATURE: 98.2 F | BODY MASS INDEX: 18.96 KG/M2 | WEIGHT: 107 LBS | DIASTOLIC BLOOD PRESSURE: 70 MMHG

## 2022-09-09 DIAGNOSIS — Z00.00 MEDICARE ANNUAL WELLNESS VISIT, INITIAL: ICD-10-CM

## 2022-09-09 DIAGNOSIS — E03.9 HYPOTHYROIDISM, UNSPECIFIED TYPE: ICD-10-CM

## 2022-09-09 DIAGNOSIS — I10 PRIMARY HYPERTENSION: Primary | ICD-10-CM

## 2022-09-09 DIAGNOSIS — E78.2 MIXED HYPERLIPIDEMIA: ICD-10-CM

## 2022-09-09 DIAGNOSIS — R01.1 SYSTOLIC EJECTION MURMUR: ICD-10-CM

## 2022-09-09 DIAGNOSIS — K21.9 GERD WITHOUT ESOPHAGITIS: ICD-10-CM

## 2022-09-09 DIAGNOSIS — Z12.31 ENCOUNTER FOR SCREENING MAMMOGRAM FOR MALIGNANT NEOPLASM OF BREAST: ICD-10-CM

## 2022-09-09 PROCEDURE — 99215 OFFICE O/P EST HI 40 MIN: CPT | Performed by: FAMILY MEDICINE

## 2022-09-09 PROCEDURE — 1160F RVW MEDS BY RX/DR IN RCRD: CPT | Performed by: FAMILY MEDICINE

## 2022-09-09 PROCEDURE — 1170F FXNL STATUS ASSESSED: CPT | Performed by: FAMILY MEDICINE

## 2022-09-09 PROCEDURE — G0438 PPPS, INITIAL VISIT: HCPCS | Performed by: FAMILY MEDICINE

## 2022-09-09 PROCEDURE — 3288F FALL RISK ASSESSMENT DOCD: CPT | Performed by: FAMILY MEDICINE

## 2022-09-09 PROCEDURE — 3077F SYST BP >= 140 MM HG: CPT | Performed by: FAMILY MEDICINE

## 2022-09-09 PROCEDURE — 3078F DIAST BP <80 MM HG: CPT | Performed by: FAMILY MEDICINE

## 2022-09-09 PROCEDURE — 1125F AMNT PAIN NOTED PAIN PRSNT: CPT | Performed by: FAMILY MEDICINE

## 2022-09-09 PROCEDURE — 3725F SCREEN DEPRESSION PERFORMED: CPT | Performed by: FAMILY MEDICINE

## 2022-09-09 NOTE — PROGRESS NOTES
Assessment and Plan:     Problem List Items Addressed This Visit        Digestive    GERD without esophagitis     STABLE  DENIES ANY CP, INDIGESTION, OR COUGH  NOTES NO MELENA OR HEMATOCHEZIA  NO HEMATEMESIS  COMPLIANT WITH MEDICATION  NO CONCERNS    - CONTINUE CURRENT TREATMENT PLAN  - MEDICATION AS PRESCRIBED  - AVOID CAFFEINE, ALCOHOL OR SMOKING              Endocrine    Hypothyroidism     STABLE              Cardiovascular and Mediastinum    Hypertension - Primary     STABLE  DENIES ANY CP, SOB, PALPITATIONS, OR HEADACHE  NOTES NO WATER RETENTION  COMPLIANT WITH MEDICATION  NO CONCERNS    - CONTINUE CURRENT TREATMENT PLAN  - MONITOR DIETARY SODIUM INTAKE  - ENCOURAGE PHYSICAL ACTIVITY  - RV 6 MONTHS           Relevant Orders    POCT ECG (Completed)       Other    Systolic ejection murmur    Relevant Orders    Echo complete w/ contrast if indicated    Encounter for screening mammogram for malignant neoplasm of breast    Relevant Orders    Mammo screening bilateral w 3d & cad    Mixed hyperlipidemia     WATCHING CHOLESTEROL INTAKE  COMPLIANT WITH MEDICATION  NO CONCERNS    - CONTINUE TO MONITOR DIETARY CHOL INTAKE  - CONTINUE CURRENT MEDICATION  - ENCOURAGED PHYSICAL ACTIVITY               Other Visit Diagnoses     Medicare annual wellness visit, initial              Depression Screening and Follow-up Plan: Patient was screened for depression during today's encounter  They screened negative with a PHQ-2 score of 0  Preventive health issues were discussed with patient, and age appropriate screening tests were ordered as noted in patient's After Visit Summary  Personalized health advice and appropriate referrals for health education or preventive services given if needed, as noted in patient's After Visit Summary       History of Present Illness:     Patient presents for a Medicare Wellness Visit    PATIENT [address] AND ANNUAL EVALUATION OF Dennys Frias ISSUES WITH THEIR CURRENT STATUS, ASSESSMENT AND PLANS ARE LISTED ABOVE         Patient Care Team:  Carmita Armando MD as PCP - [address] [place] [address] (RTE)  Seth Tripathi MD (Gastroenterology)  Shira Rivers MD (Oncology)     Review of Systems:     Review of Systems   Constitutional: Negative for chills, fatigue and fever  HENT: Negative for congestion, ear discharge, ear pain, mouth sores, postnasal drip, sore throat and trouble swallowing  Eyes: Negative for pain, discharge and visual disturbance  Respiratory: Negative for cough, shortness of breath and wheezing  Cardiovascular: Negative for chest pain, palpitations and leg swelling  Gastrointestinal: Negative for abdominal distention, abdominal pain, blood in stool, diarrhea and nausea  Endocrine: Negative for polydipsia, polyphagia and polyuria  Genitourinary: Negative for dysuria, frequency, hematuria and urgency  Musculoskeletal: Negative for arthralgias, gait problem and joint swelling  Skin: Negative for pallor and rash  Neurological: Negative for dizziness, syncope, speech difficulty, weakness, light-headedness, numbness and headaches  Hematological: Negative for adenopathy  Psychiatric/Behavioral: Negative for behavioral problems, confusion and sleep disturbance  The patient is not nervous/anxious  Problem List:     Patient Active Problem List   Diagnosis    GERD without esophagitis    Hypertension    Hypothyroidism    Systolic ejection murmur    Iron deficiency anemia due to chronic blood loss    Encounter for screening mammogram for malignant neoplasm of breast    Screening for colon cancer    Asymptomatic menopause    Screening for osteoporosis    Closed nondisplaced fracture of second metatarsal bone of left foot    Mixed hyperlipidemia    Osteopenia of multiple sites      Past Medical and Surgical History:     History reviewed  No pertinent past medical history    Past Surgical History:   Procedure Laterality Date    ABDOMINAL SURGERY        Family History:     History reviewed  No pertinent family history  Social History:     Social History     Socioeconomic History    Marital status: /Civil Union     Spouse name: None    Number of children: None    Years of education: None    Highest education level: None   Occupational History    None   Tobacco Use    Smoking status: Never Smoker    Smokeless tobacco: Never Used   Vaping Use    Vaping Use: Never used   Substance and Sexual Activity    Alcohol use: Yes     Comment: SOC    Drug use: Never    Sexual activity: None   Other Topics Concern    None   Social History Narrative    None     Social Determinants of Health     Financial Resource Strain: Low Risk     Difficulty of Paying Living Expenses: Not very hard   Food Insecurity: Not on file   Transportation Needs: No Transportation Needs    Lack of Transportation (Medical): No    Lack of Transportation (Non-Medical): No   Physical Activity: Not on file   Stress: Not on file   Social Connections: Not on file   Intimate Partner Violence: Not on file   Housing Stability: Not on file      Medications and Allergies:     Current Outpatient Medications   Medication Sig Dispense Refill    amLODIPine-benazepril (LOTREL) 10-40 MG per capsule TAKE 1 CAPSULE BY MOUTH  DAILY 90 capsule 3    Calcium-Magnesium-Vitamin D (CALCIUM 1200+D3 PO) Take by mouth in the morning      ferric carboxymaltose (Injectafer) injection infuse 750 mg by intravenous route days 1 & 8; may repeat in 4-6 weeks      Ferrous Sulfate (IRON PO) Take by mouth 3 (three) times a week      levothyroxine 50 mcg tablet TAKE 1 TABLET BY MOUTH  DAILY 90 tablet 3    multivitamin (THERAGRAN) TABS Take 1 tablet by mouth daily      omeprazole (PriLOSEC) 20 mg delayed release capsule        No current facility-administered medications for this visit       Allergies   Allergen Reactions    Clavulanic Acid Hives    Pollen Extract Nasal Congestion    Amoxicillin Hives and Rash      Immunizations:     Immunization History   Administered Date(s) Administered    COVID-19 MODERNA VACC 0 5 ML IM 03/03/2021, 03/31/2021    INFLUENZA 10/06/2017    Influenza, injectable, quadrivalent, preservative free 0 5 mL 10/24/2018, 10/31/2019    Influenza, seasonal, injectable 1956, 10/12/2014, 10/23/2015, 10/22/2016, 10/12/2021    Tdap 07/23/2015      Health Maintenance:         Topic Date Due    Colorectal Cancer Screening  09/17/2022    Breast Cancer Screening: Mammogram  10/18/2022    DXA SCAN  10/18/2026    Hepatitis C Screening  Completed         Topic Date Due    Pneumococcal Vaccine: 65+ Years (1 - PCV) Never done    COVID-19 Vaccine (3 - Booster for Moderna series) 08/31/2021    Influenza Vaccine (1) 09/01/2022      Medicare Screening Tests and Risk Assessments:         Health Risk Assessment:   Patient feels that their physical health rating is same  Patient is very satisfied with their life  Eyesight was rated as same  Hearing was rated as same  Patient feels that their emotional and mental health rating is same  Patients states they are never, rarely angry  Patient states they are never, rarely unusually tired/fatigued  Pain experienced in the last 7 days has been none  Patient states that she has experienced no weight loss or gain in last 6 months  Depression Screening:   PHQ-2 Score: 0      Fall Risk Screening: In the past year, patient has experienced: no history of falling in past year      Urinary Incontinence Screening:   Patient has not leaked urine accidently in the last six months  Home Safety:  Patient does not have trouble with stairs inside or outside of their home  Patient has working smoke alarms and has working carbon monoxide detector  Home safety hazards include: none  Nutrition:   Current diet is Regular  Medications:   Patient is currently taking over-the-counter supplements   OTC medications include: see medication list  Patient is able to manage medications  Activities of Daily Living (ADLs)/Instrumental Activities of Daily Living (IADLs):   Walk and transfer into and out of bed and chair?: Yes  Dress and groom yourself?: Yes    Bathe or shower yourself?: Yes    Feed yourself? Yes  Do your laundry/housekeeping?: Yes  Manage your money, pay your bills and track your expenses?: Yes  Make your own meals?: Yes    Do your own shopping?: Yes    Previous Hospitalizations:   Any hospitalizations or ED visits within the last 12 months?: No      Advance Care Planning:   Living will: Yes    Durable POA for healthcare: Yes    Advanced directive: Yes    Provider agrees with end of life decisions: Yes      Cognitive Screening:   Provider or family/friend/caregiver concerned regarding cognition?: No    PREVENTIVE SCREENINGS      Cardiovascular Screening:    General: Screening Not Indicated and History Lipid Disorder      Diabetes Screening:     General: Screening Current      Colorectal Cancer Screening:     General: Screening Current      Breast Cancer Screening:     General: Screening Current      Cervical Cancer Screening:    General: Screening Not Indicated      Osteoporosis Screening:    General: Screening Current      Abdominal Aortic Aneurysm (AAA) Screening:        General: Screening Not Indicated      Lung Cancer Screening:     General: Screening Not Indicated      Hepatitis C Screening:    General: Screening Current    Screening, Brief Intervention, and Referral to Treatment (SBIRT)    Screening  Typical number of drinks in a day: 0  Typical number of drinks in a week: 3  Interpretation: Low risk drinking behavior      Single Item Drug Screening:  How often have you used an illegal drug (including marijuana) or a prescription medication for non-medical reasons in the past year? never    Single Item Drug Screen Score: 0  Interpretation: Negative screen for possible drug use disorder    No exam data present Physical Exam:     /70   Pulse 103   Temp 98 2 °F (36 8 °C) (Temporal)   Resp 12   Ht 5' 3" (1 6 m)   Wt 48 5 kg (107 lb)   SpO2 98%   BMI 18 95 kg/m²     Physical Exam  Constitutional:       General: She is not in acute distress  Appearance: Normal appearance  She is well-developed and normal weight  HENT:      Head: Normocephalic and atraumatic  Right Ear: Tympanic membrane and external ear normal       Left Ear: Tympanic membrane and external ear normal       Nose: Nose normal       Mouth/Throat:      Pharynx: No oropharyngeal exudate  Eyes:      General: No scleral icterus  Right eye: No discharge  Left eye: No discharge  Conjunctiva/sclera: Conjunctivae normal       Pupils: Pupils are equal, round, and reactive to light  Neck:      Thyroid: No thyromegaly  Trachea: No tracheal deviation  Cardiovascular:      Rate and Rhythm: Normal rate and regular rhythm  Heart sounds: Normal heart sounds  No murmur heard  No friction rub  No gallop  Pulmonary:      Effort: Pulmonary effort is normal  No respiratory distress  Breath sounds: Normal breath sounds  No wheezing or rales  Chest:      Chest wall: No tenderness  Abdominal:      General: Bowel sounds are normal  There is no distension  Palpations: Abdomen is soft  There is no mass  Tenderness: There is no abdominal tenderness  There is no guarding or rebound  Hernia: No hernia is present  Musculoskeletal:         General: No tenderness or deformity  Normal range of motion  Cervical back: Normal range of motion and neck supple  Lymphadenopathy:      Cervical: No cervical adenopathy  Skin:     General: Skin is warm and dry  Findings: No erythema or rash  Neurological:      General: No focal deficit present  Mental Status: She is alert and oriented to person, place, and time  Cranial Nerves: No cranial nerve deficit  Sensory: No sensory deficit  Motor: No weakness or abnormal muscle tone  Coordination: Coordination normal       Gait: Gait normal       Deep Tendon Reflexes: Reflexes normal    Psychiatric:         Mood and Affect: Mood normal          Behavior: Behavior normal          Thought Content:  Thought content normal          Judgment: Judgment normal           Dora Kevin MD

## 2022-09-09 NOTE — LETTER
MARY KAY      Current Outpatient Medications:     amLODIPine-benazepril (LOTREL) 10-40 MG per capsule, TAKE 1 CAPSULE BY MOUTH  DAILY, Disp: 90 capsule, Rfl: 3    Calcium-Magnesium-Vitamin D (CALCIUM 1200+D3 PO), Take by mouth in the morning, Disp: , Rfl:     ferric carboxymaltose (Injectafer) injection, infuse 750 mg by intravenous route days 1 & 8; may repeat in 4-6 weeks, Disp: , Rfl:     Ferrous Sulfate (IRON PO), Take by mouth 3 (three) times a week, Disp: , Rfl:     levothyroxine 50 mcg tablet, TAKE 1 TABLET BY MOUTH  DAILY, Disp: 90 tablet, Rfl: 3    multivitamin (THERAGRAN) TABS, Take 1 tablet by mouth daily, Disp: , Rfl:     omeprazole (PriLOSEC) 20 mg delayed release capsule, , Disp: , Rfl:       Recent Results (from the past 672 hour(s))   CBC and differential    Collection Time: 08/29/22 12:35 PM   Result Value Ref Range    White Blood Cell Count 9 3 3 4 - 10 8 x10E3/uL    Red Blood Cell Count 4 21 3 77 - 5 28 x10E6/uL    Hemoglobin 11 1 11 1 - 15 9 g/dL    HCT 35 9 34 0 - 46 6 %    MCV 85 79 - 97 fL    MCH 26 4 (L) 26 6 - 33 0 pg    MCHC 30 9 (L) 31 5 - 35 7 g/dL    RDW 14 9 11 7 - 15 4 %    Platelet Count 859 (H) 150 - 450 x10E3/uL    Neutrophils 70 Not Estab  %    Lymphocytes 21 Not Estab  %    Monocytes 7 Not Estab  %    Eosinophils 0 Not Estab  %    Basophils PCT 1 Not Estab  %    Neutrophils (Absolute) 6 6 1 4 - 7 0 x10E3/uL    Lymphocytes (Absolute) 2 0 0 7 - 3 1 x10E3/uL    Monocytes (Absolute) 0 6 0 1 - 0 9 x10E3/uL    Eosinophils (Absolute) 0 0 0 0 - 0 4 x10E3/uL    Basophils ABS 0 1 0 0 - 0 2 x10E3/uL    Immature Granulocytes 1 Not Estab  %    Immature Granulocytes (Absolute) 0 1 0 0 - 0 1 x10E3/uL   Comprehensive metabolic panel    Collection Time: 08/29/22 12:35 PM   Result Value Ref Range    Glucose, Random 98 65 - 99 mg/dL    BUN 32 (H) 8 - 27 mg/dL    Creatinine 1 19 (H) 0 57 - 1 00 mg/dL    eGFR 50 (L) >59 mL/min/1 73    SL AMB BUN/CREATININE RATIO 27 12 - 28    Sodium 138 134 - 144 mmol/L    Potassium 5 7 (H) 3 5 - 5 2 mmol/L    Chloride 99 96 - 106 mmol/L    CO2 23 20 - 29 mmol/L    CALCIUM 10 6 (H) 8 7 - 10 3 mg/dL    Protein, Total 7 2 6 0 - 8 5 g/dL    Albumin 5 1 (H) 3 8 - 4 8 g/dL    Globulin, Total 2 1 1 5 - 4 5 g/dL    Albumin/Globulin Ratio 2 4 (H) 1 2 - 2 2    TOTAL BILIRUBIN <0 2 0 0 - 1 2 mg/dL    Alk Phos Isoenzymes 93 44 - 121 IU/L    AST 30 0 - 40 IU/L    ALT 15 0 - 32 IU/L   Lipid panel    Collection Time: 08/29/22 12:35 PM   Result Value Ref Range    Cholesterol, Total 253 (H) 100 - 199 mg/dL    Triglycerides 167 (H) 0 - 149 mg/dL    HDL 74 >39 mg/dL    VLDL Cholesterol Calculated 30 5 - 40 mg/dL    LDL Calculated 149 (H) 0 - 99 mg/dL    T   Chol/HDL Ratio 3 4 0 0 - 4 4 ratio   TSH, 3rd generation    Collection Time: 08/29/22 12:35 PM   Result Value Ref Range    TSH 3 230 0 450 - 4 500 uIU/mL   Uric acid    Collection Time: 08/29/22 12:35 PM   Result Value Ref Range    Uric Acid 5 8 3 0 - 7 2 mg/dL   Iron    Collection Time: 08/29/22 12:35 PM   Result Value Ref Range    Iron, Serum 32 27 - 139 ug/dL

## 2022-09-10 PROCEDURE — 93000 ELECTROCARDIOGRAM COMPLETE: CPT | Performed by: FAMILY MEDICINE

## 2022-10-12 PROBLEM — Z13.820 SCREENING FOR OSTEOPOROSIS: Status: RESOLVED | Noted: 2021-09-07 | Resolved: 2022-10-12

## 2022-10-12 PROBLEM — Z12.11 SCREENING FOR COLON CANCER: Status: RESOLVED | Noted: 2021-09-07 | Resolved: 2022-10-12

## 2022-11-04 DIAGNOSIS — Z12.31 ENCOUNTER FOR SCREENING MAMMOGRAM FOR MALIGNANT NEOPLASM OF BREAST: ICD-10-CM

## 2023-04-21 PROBLEM — N18.31 STAGE 3A CHRONIC KIDNEY DISEASE (HCC): Status: ACTIVE | Noted: 2023-04-21

## 2023-04-21 PROBLEM — E44.1 MILD PROTEIN-CALORIE MALNUTRITION (HCC): Status: ACTIVE | Noted: 2023-04-21

## 2023-04-28 ENCOUNTER — OFFICE VISIT (OUTPATIENT)
Dept: FAMILY MEDICINE CLINIC | Facility: CLINIC | Age: 68
End: 2023-04-28

## 2023-04-28 VITALS
WEIGHT: 103 LBS | OXYGEN SATURATION: 97 % | SYSTOLIC BLOOD PRESSURE: 148 MMHG | DIASTOLIC BLOOD PRESSURE: 62 MMHG | RESPIRATION RATE: 12 BRPM | HEIGHT: 63 IN | TEMPERATURE: 97 F | BODY MASS INDEX: 18.25 KG/M2 | HEART RATE: 117 BPM

## 2023-04-28 DIAGNOSIS — R05.2 SUBACUTE COUGH: Primary | ICD-10-CM

## 2023-04-28 RX ORDER — BENZONATATE 200 MG/1
200 CAPSULE ORAL 3 TIMES DAILY PRN
Qty: 20 CAPSULE | Refills: 0 | Status: SHIPPED | OUTPATIENT
Start: 2023-04-28

## 2023-04-28 NOTE — PROGRESS NOTES
Assessment/Plan:    1  Subacute cough  -     benzonatate (TESSALON) 200 MG capsule; Take 1 capsule (200 mg total) by mouth 3 (three) times a day as needed for cough            Patient Instructions   Add on Claritan (loratidine) 10 mg daily for allergy control       Return if symptoms worsen or fail to improve  Subjective:      Patient ID: Gillian Reagan is a 80 y o  female  Chief Complaint   Patient presents with   • Follow-up     Pt here for f/u and recheck lungs  Tegan Carnes is a 80year old female who presents to the office for a recheck of cough  Pt was seen in the office about one week ago and given prednisone taper for bronchitis  Reports that she feels overall improvement, but continue to have dry cough  States that the cough has improved but is persistent  The following portions of the patient's history were reviewed and updated as appropriate: allergies, current medications, past family history, past medical history, past social history, past surgical history and problem list     Review of Systems   Constitutional: Negative for chills, diaphoresis, fatigue and fever  HENT: Negative for congestion, ear discharge, ear pain, postnasal drip, rhinorrhea, sinus pressure, sinus pain and sore throat  Eyes: Negative for pain and discharge  Respiratory: Positive for cough  Negative for chest tightness, shortness of breath and wheezing  Cardiovascular: Negative for chest pain  Musculoskeletal: Negative for myalgias  Neurological: Negative for dizziness and headaches  Hematological: Negative for adenopathy           Current Outpatient Medications   Medication Sig Dispense Refill   • amLODIPine-benazepril (LOTREL) 10-40 MG per capsule TAKE 1 CAPSULE BY MOUTH  DAILY 90 capsule 3   • benzonatate (TESSALON) 200 MG capsule Take 1 capsule (200 mg total) by mouth 3 (three) times a day as needed for cough 20 capsule 0   • Calcium-Magnesium-Vitamin D (CALCIUM 1200+D3 PO) Take by mouth in the "morning     • ferric carboxymaltose (Injectafer) injection infuse 750 mg by intravenous route days 1 & 8; may repeat in 4-6 weeks     • Ferrous Sulfate (IRON PO) Take by mouth 3 (three) times a week     • levothyroxine 50 mcg tablet TAKE 1 TABLET BY MOUTH  DAILY 90 tablet 3   • multivitamin (THERAGRAN) TABS Take 1 tablet by mouth daily     • omeprazole (PriLOSEC) 40 MG capsule        No current facility-administered medications for this visit  Objective:    /62 (BP Location: Right arm, Patient Position: Sitting, Cuff Size: Large)   Pulse (!) 117   Temp (!) 97 °F (36 1 °C) (Temporal)   Resp 12   Ht 5' 3\" (1 6 m)   Wt 46 7 kg (103 lb)   SpO2 97%   BMI 18 25 kg/m²        Physical Exam  Vitals reviewed  Constitutional:       General: She is not in acute distress  Appearance: She is well-developed  She is not diaphoretic  HENT:      Head: Normocephalic and atraumatic  Mouth/Throat:      Pharynx: Oropharynx is clear  No posterior oropharyngeal erythema  Eyes:      General: Lids are normal          Right eye: No discharge  Left eye: No discharge  Extraocular Movements: Extraocular movements intact  Conjunctiva/sclera: Conjunctivae normal       Pupils: Pupils are equal, round, and reactive to light  Pupils are equal    Neck:      Thyroid: No thyromegaly  Cardiovascular:      Rate and Rhythm: Normal rate and regular rhythm  Heart sounds: Normal heart sounds, S1 normal and S2 normal    Pulmonary:      Effort: Pulmonary effort is normal       Breath sounds: Examination of the right-upper field reveals wheezing  Wheezing present  No decreased breath sounds, rhonchi or rales  Comments: Scant expiratory wheeze right upper lung field   Musculoskeletal:      Cervical back: Full passive range of motion without pain, normal range of motion and neck supple  Lymphadenopathy:      Cervical: No cervical adenopathy  Skin:     General: Skin is warm and dry        Findings: " No rash  Neurological:      Mental Status: She is alert and oriented to person, place, and time  Psychiatric:         Behavior: Behavior normal          Thought Content:  Thought content normal          Judgment: Judgment normal                 Sukhdeep GonzaleserPRICE

## 2023-07-19 DIAGNOSIS — E03.9 HYPOTHYROIDISM, UNSPECIFIED TYPE: ICD-10-CM

## 2023-07-19 DIAGNOSIS — K21.9 GERD WITHOUT ESOPHAGITIS: ICD-10-CM

## 2023-07-19 DIAGNOSIS — I10 PRIMARY HYPERTENSION: ICD-10-CM

## 2023-07-19 DIAGNOSIS — E78.2 MIXED HYPERLIPIDEMIA: ICD-10-CM

## 2023-07-19 DIAGNOSIS — Z00.00 MEDICARE ANNUAL WELLNESS VISIT, SUBSEQUENT: Primary | ICD-10-CM

## 2023-08-10 DIAGNOSIS — E03.9 ACQUIRED HYPOTHYROIDISM: ICD-10-CM

## 2023-08-10 RX ORDER — LEVOTHYROXINE SODIUM 0.05 MG/1
TABLET ORAL
Qty: 90 TABLET | Refills: 3 | Status: SHIPPED | OUTPATIENT
Start: 2023-08-10

## 2023-08-10 RX ORDER — AMLODIPINE AND BENAZEPRIL HYDROCHLORIDE 10; 40 MG/1; MG/1
1 CAPSULE ORAL DAILY
Qty: 90 CAPSULE | Refills: 3 | Status: SHIPPED | OUTPATIENT
Start: 2023-08-10

## 2023-09-11 ENCOUNTER — OFFICE VISIT (OUTPATIENT)
Dept: FAMILY MEDICINE CLINIC | Facility: CLINIC | Age: 68
End: 2023-09-11
Payer: COMMERCIAL

## 2023-09-11 VITALS
HEART RATE: 109 BPM | OXYGEN SATURATION: 98 % | RESPIRATION RATE: 16 BRPM | HEIGHT: 64 IN | WEIGHT: 104 LBS | TEMPERATURE: 97.5 F | DIASTOLIC BLOOD PRESSURE: 58 MMHG | BODY MASS INDEX: 17.75 KG/M2 | SYSTOLIC BLOOD PRESSURE: 136 MMHG

## 2023-09-11 DIAGNOSIS — Z12.31 ENCOUNTER FOR SCREENING MAMMOGRAM FOR MALIGNANT NEOPLASM OF BREAST: ICD-10-CM

## 2023-09-11 DIAGNOSIS — I10 PRIMARY HYPERTENSION: Primary | ICD-10-CM

## 2023-09-11 DIAGNOSIS — Z78.0 ASYMPTOMATIC MENOPAUSE: ICD-10-CM

## 2023-09-11 DIAGNOSIS — D50.0 IRON DEFICIENCY ANEMIA DUE TO CHRONIC BLOOD LOSS: ICD-10-CM

## 2023-09-11 DIAGNOSIS — R01.1 SYSTOLIC EJECTION MURMUR: ICD-10-CM

## 2023-09-11 DIAGNOSIS — E03.9 HYPOTHYROIDISM, UNSPECIFIED TYPE: ICD-10-CM

## 2023-09-11 DIAGNOSIS — Z13.820 SCREENING FOR OSTEOPOROSIS: ICD-10-CM

## 2023-09-11 DIAGNOSIS — Z00.00 MEDICARE ANNUAL WELLNESS VISIT, SUBSEQUENT: ICD-10-CM

## 2023-09-11 DIAGNOSIS — K21.9 GERD WITHOUT ESOPHAGITIS: ICD-10-CM

## 2023-09-11 DIAGNOSIS — N18.31 STAGE 3A CHRONIC KIDNEY DISEASE (HCC): ICD-10-CM

## 2023-09-11 DIAGNOSIS — E78.2 MIXED HYPERLIPIDEMIA: ICD-10-CM

## 2023-09-11 PROBLEM — S92.325A CLOSED NONDISPLACED FRACTURE OF SECOND METATARSAL BONE OF LEFT FOOT: Status: RESOLVED | Noted: 2021-09-07 | Resolved: 2023-09-11

## 2023-09-11 PROBLEM — E44.1 MILD PROTEIN-CALORIE MALNUTRITION (HCC): Status: RESOLVED | Noted: 2023-04-21 | Resolved: 2023-09-11

## 2023-09-11 PROCEDURE — 99215 OFFICE O/P EST HI 40 MIN: CPT | Performed by: FAMILY MEDICINE

## 2023-09-11 PROCEDURE — G0439 PPPS, SUBSEQ VISIT: HCPCS | Performed by: FAMILY MEDICINE

## 2023-09-11 NOTE — PROGRESS NOTES
Assessment and Plan:     Problem List Items Addressed This Visit        Digestive    GERD without esophagitis     STABLE  DENIES ANY CP, INDIGESTION, OR COUGH  NOTES NO MELENA OR HEMATOCHEZIA  NO HEMATEMESIS  COMPLIANT WITH MEDICATION  NO CONCERNS    - CONTINUE CURRENT TREATMENT PLAN  - MEDICATION AS PRESCRIBED  - AVOID CAFFEINE, ALCOHOL OR SMOKING              Endocrine    Hypothyroidism       Cardiovascular and Mediastinum    Hypertension - Primary     STABLE  DENIES ANY CP, SOB, PALPITATIONS, OR HEADACHE  NOTES NO WATER RETENTION  COMPLIANT WITH MEDICATION  NO CONCERNS    - CONTINUE CURRENT TREATMENT PLAN  - MONITOR DIETARY SODIUM INTAKE  - ENCOURAGE PHYSICAL ACTIVITY  - RV 3 MONTHS           Relevant Orders    POCT ECG (Completed)    Echo complete w/ contrast if indicated       Genitourinary    Stage 3a chronic kidney disease (HCC)       Other    Systolic ejection murmur    Relevant Orders    POCT ECG (Completed)    Echo complete w/ contrast if indicated    Iron deficiency anemia due to chronic blood loss    Relevant Orders    POCT ECG (Completed)    Encounter for screening mammogram for malignant neoplasm of breast    Relevant Orders    Mammo screening bilateral w cad    Asymptomatic menopause    Relevant Orders    DXA bone density spine hip and pelvis    Mixed hyperlipidemia     WATCHING CHOLESTEROL INTAKE  COMPLIANT WITH MEDICATION  NO CONCERNS    - CONTINUE TO MONITOR DIETARY CHOL INTAKE  - CONTINUE CURRENT MEDICATION  - ENCOURAGED PHYSICAL ACTIVITY            Other Visit Diagnoses     Medicare annual wellness visit, subsequent        Screening for osteoporosis        Relevant Orders    DXA bone density spine hip and pelvis        BMI Counseling: Body mass index is 17.85 kg/m². The BMI is below normal. Patient advised to gain weight. Rationale for BMI follow-up plan is due to patient being underweight. Depression Screening and Follow-up Plan: Patient was screened for depression during today's encounter. They screened negative with a PHQ-2 score of 0. Preventive health issues were discussed with patient, and age appropriate screening tests were ordered as noted in patient's After Visit Summary. Personalized health advice and appropriate referrals for health education or preventive services given if needed, as noted in patient's After Visit Summary. History of Present Illness:     Patient presents for a Medicare Wellness Visit    PATIENT RETURNS FOR ANNUAL WELLNESS EXAM AND ANNUAL EVALUATION OF PATIENT'S MEDICAL ISSUES    INDIVIDUAL MEDICAL ISSUES WITH THEIR CURRENT STATUS, ASSESSMENT AND PLANS ARE LISTED ABOVE         Patient Care Team:  Kranthi ePrales MD as PCP - [address] [place] [address] (RTE)  Rachael Figueroa MD (Gastroenterology)  Bro Clark MD (Oncology)     Review of Systems:     Review of Systems   Constitutional: Negative for chills, fatigue and fever. HENT: Negative for congestion, ear discharge, ear pain, mouth sores, postnasal drip, sore throat and trouble swallowing. Eyes: Negative for pain, discharge and visual disturbance. Respiratory: Negative for cough, shortness of breath and wheezing. Cardiovascular: Negative for chest pain, palpitations and leg swelling. Gastrointestinal: Negative for abdominal distention, abdominal pain, blood in stool, diarrhea and nausea. Endocrine: Negative for polydipsia, polyphagia and polyuria. Genitourinary: Negative for dysuria, frequency, hematuria and urgency. Musculoskeletal: Negative for arthralgias, gait problem and joint swelling. Skin: Negative for pallor and rash. Neurological: Negative for dizziness, syncope, speech difficulty, weakness, light-headedness, numbness and headaches. Hematological: Negative for adenopathy. Psychiatric/Behavioral: Negative for behavioral problems, confusion and sleep disturbance. The patient is not nervous/anxious.          Problem List:     Patient Active Problem List   Diagnosis   • GERD without esophagitis   • Hypertension   • Hypothyroidism   • Systolic ejection murmur   • Iron deficiency anemia due to chronic blood loss   • Encounter for screening mammogram for malignant neoplasm of breast   • Asymptomatic menopause   • Mixed hyperlipidemia   • Osteopenia of multiple sites   • Stage 3a chronic kidney disease ([address])      Past Medical and Surgical History:     History reviewed. No pertinent past medical history. Past Surgical History:   Procedure Laterality Date   • ABDOMINAL SURGERY        Family History:     History reviewed. No pertinent family history. Social History:     Social History     Socioeconomic History   • Marital status: /Civil Union     Spouse name: None   • Number of children: None   • Years of education: None   • Highest education level: None   Occupational History   • None   Tobacco Use   • Smoking status: Never   • Smokeless tobacco: Never   Vaping Use   • Vaping Use: Never used   Substance and Sexual Activity   • Alcohol use: Yes     Comment: SOC   • Drug use: Never   • Sexual activity: None   Other Topics Concern   • None   Social History Narrative   • None     Social Determinants of Health     Financial Resource Strain: Low Risk  (9/11/2023)    Overall Financial Resource Strain (CARDIA)    • Difficulty of Paying Living Expenses: Not hard at all   Food Insecurity: Not on file   Transportation Needs: No Transportation Needs (9/11/2023)    PRAPARE - Transportation    • Lack of Transportation (Medical): No    • Lack of Transportation (Non-Medical):  No   Physical Activity: Not on file   Stress: Not on file   Social Connections: Not on file   Intimate Partner Violence: Not on file   Housing Stability: Not on file      Medications and Allergies:     Current Outpatient Medications   Medication Sig Dispense Refill   • amLODIPine-benazepril (LOTREL) 10-40 MG per capsule TAKE 1 CAPSULE BY MOUTH  DAILY 90 capsule 3   • Calcium-Magnesium-Vitamin D (CALCIUM 1200+D3 PO) Take by mouth in the morning     • ferric carboxymaltose (Injectafer) injection infuse 750 mg by intravenous route days 1 & 8; may repeat in 4-6 weeks     • Ferrous Sulfate (IRON PO) Take by mouth 3 (three) times a week     • multivitamin (THERAGRAN) TABS Take 1 tablet by mouth daily     • omeprazole (PriLOSEC) 40 MG capsule      • levothyroxine 50 mcg tablet TAKE 1 TABLET BY MOUTH  DAILY 90 tablet 3     No current facility-administered medications for this visit. Allergies   Allergen Reactions   • Clavulanic Acid Hives   • Pollen Extract Nasal Congestion   • Amoxicillin Hives and Rash      Immunizations:     Immunization History   Administered Date(s) Administered   • COVID-19 MODERNA VACC 0.5 ML IM 03/03/2021, 03/31/2021   • INFLUENZA 10/06/2017   • Influenza, injectable, quadrivalent, preservative free 0.5 mL 10/24/2018, 10/31/2019   • Influenza, seasonal, injectable 1955, 10/12/2014, 10/23/2015, 10/22/2016, 10/12/2021   • Tdap 07/23/2015   • Zoster Vaccine Recombinant 04/28/2018, 07/17/2018      Health Maintenance:         Topic Date Due   • Colorectal Cancer Screening  09/17/2022   • Breast Cancer Screening: Mammogram  11/02/2023   • DXA SCAN  10/18/2026   • Hepatitis C Screening  Completed         Topic Date Due   • Pneumococcal Vaccine: 65+ Years (1 - PCV) Never done   • COVID-19 Vaccine (3 - Virgia Lamer series) 05/26/2021   • Influenza Vaccine (1) 09/01/2023      Medicare Screening Tests and Risk Assessments:         Health Risk Assessment:   Patient rates overall health as very good. Patient feels that their physical health rating is same. Patient is very satisfied with their life. Eyesight was rated as same. Hearing was rated as same. Patient feels that their emotional and mental health rating is same. Patients states they are never, rarely angry. Patient states they are sometimes unusually tired/fatigued. Pain experienced in the last 7 days has been none.  Patient states that she has experienced no weight loss or gain in last 6 months. Depression Screening:   PHQ-2 Score: 0      Fall Risk Screening: In the past year, patient has experienced: no history of falling in past year      Urinary Incontinence Screening:   Patient has not leaked urine accidently in the last six months. Home Safety:  Patient does not have trouble with stairs inside or outside of their home. Patient has working smoke alarms and has working carbon monoxide detector. Home safety hazards include: none. Nutrition:   Current diet is Regular. Medications:   Patient is not currently taking any over-the-counter supplements. Patient is able to manage medications. Activities of Daily Living (ADLs)/Instrumental Activities of Daily Living (IADLs):   Walk and transfer into and out of bed and chair?: Yes  Dress and groom yourself?: Yes    Bathe or shower yourself?: Yes    Feed yourself? Yes  Do your laundry/housekeeping?: Yes  Manage your money, pay your bills and track your expenses?: Yes  Make your own meals?: Yes    Do your own shopping?: Yes    Advance Care Planning:   Living will: Yes    Durable POA for healthcare:  Yes    Advanced directive: Yes    Advanced directive counseling given: Yes    Provider agrees with end of life decisions: Yes      Cognitive Screening:   Provider or family/friend/caregiver concerned regarding cognition?: No    PREVENTIVE SCREENINGS      Cardiovascular Screening:    General: Screening Not Indicated and History Lipid Disorder      Diabetes Screening:     General: Screening Current      Colorectal Cancer Screening:     General: Screening Current      Breast Cancer Screening:     General: Screening Current      Cervical Cancer Screening:    General: Screening Not Indicated      Osteoporosis Screening:    General: Screening Current      Abdominal Aortic Aneurysm (AAA) Screening:        General: Screening Not Indicated      Lung Cancer Screening:     General: Screening Not Indicated      Hepatitis C Screening:    General: Screening Current    Screening, Brief Intervention, and Referral to Treatment (SBIRT)    Screening  Typical number of drinks in a day: 0  Typical number of drinks in a week: 2  Interpretation: Low risk drinking behavior. Single Item Drug Screening:  How often have you used an illegal drug (including marijuana) or a prescription medication for non-medical reasons in the past year? never    Single Item Drug Screen Score: 0  Interpretation: Negative screen for possible drug use disorder    No results found. Physical Exam:     /58 (BP Location: Left arm, Patient Position: Sitting, Cuff Size: Standard)   Pulse (!) 109   Temp 97.5 °F (36.4 °C) (Temporal)   Resp 16   Ht 5' 4" (1.626 m)   Wt 47.2 kg (104 lb)   SpO2 98%   BMI 17.85 kg/m²     Physical Exam  Constitutional:       General: She is not in acute distress. Appearance: Normal appearance. She is well-developed. She is not ill-appearing. HENT:      Head: Normocephalic and atraumatic. Right Ear: Tympanic membrane and external ear normal.      Left Ear: Tympanic membrane and external ear normal.      Nose: Nose normal.      Mouth/Throat:      Mouth: Mucous membranes are moist.      Pharynx: No oropharyngeal exudate. Eyes:      General: No scleral icterus. Right eye: No discharge. Left eye: No discharge. Conjunctiva/sclera: Conjunctivae normal.      Pupils: Pupils are equal, round, and reactive to light. Neck:      Thyroid: No thyromegaly. Trachea: No tracheal deviation. Cardiovascular:      Rate and Rhythm: Normal rate and regular rhythm. Heart sounds: Murmur heard. No friction rub. No gallop. Pulmonary:      Effort: Pulmonary effort is normal. No respiratory distress. Breath sounds: Normal breath sounds. No wheezing or rales. Chest:      Chest wall: No tenderness. Abdominal:      General: Bowel sounds are normal. There is no distension.       Palpations: Abdomen is soft. There is no mass. Tenderness: There is no abdominal tenderness. There is no guarding or rebound. Hernia: No hernia is present. Musculoskeletal:         General: No tenderness or deformity. Normal range of motion. Cervical back: Normal range of motion and neck supple. Lymphadenopathy:      Cervical: No cervical adenopathy. Skin:     General: Skin is warm and dry. Findings: No erythema or rash. Neurological:      General: No focal deficit present. Mental Status: She is alert and oriented to person, place, and time. Cranial Nerves: No cranial nerve deficit. Motor: No abnormal muscle tone. Coordination: Coordination normal.      Deep Tendon Reflexes: Reflexes normal.   Psychiatric:         Mood and Affect: Mood normal.         Behavior: Behavior normal.         Thought Content:  Thought content normal.         Judgment: Judgment normal.          Shabbir Tamez MD

## 2023-09-11 NOTE — PATIENT INSTRUCTIONS
DISCUSSED HEALTH ISSUES  HEALTHY DIET AND EXERCISE  BW WILL BE OBTAINED  MAMMOGRAPHY   RECOMMEND CALCIUM 1195-3272 MG DAILY  VITAMIN D3  1000 IU DAILY  RV IN 1 YEAR FOR ANNUAL EXAM, SOONER IF NEEDED      Rv 6m        Medicare Preventive Visit Patient Instructions  Thank you for completing your Welcome to Medicare Visit or Medicare Annual Wellness Visit today. Your next wellness visit will be due in one year (9/11/2024). The screening/preventive services that you may require over the next 5-10 years are detailed below. Some tests may not apply to you based off risk factors and/or age. Screening tests ordered at today's visit but not completed yet may show as past due. Also, please note that scanned in results may not display below. Preventive Screenings:  Service Recommendations Previous Testing/Comments   Colorectal Cancer Screening  * Colonoscopy    * Fecal Occult Blood Test (FOBT)/Fecal Immunochemical Test (FIT)  * Fecal DNA/Cologuard Test  * Flexible Sigmoidoscopy Age: 43-73 years old   Colonoscopy: every 10 years (may be performed more frequently if at higher risk)  OR  FOBT/FIT: every 1 year  OR  Cologuard: every 3 years  OR  Sigmoidoscopy: every 5 years  Screening may be recommended earlier than age 39 if at higher risk for colorectal cancer. Also, an individualized decision between you and your healthcare provider will decide whether screening between the ages of 77-80 would be appropriate. Colonoscopy: 09/17/2019  FOBT/FIT: Not on file  Cologuard: Not on file  Sigmoidoscopy: Not on file    Screening Current     Breast Cancer Screening Age: 36 years old  Frequency: every 1-2 years  Not required if history of left and right mastectomy Mammogram: 11/02/2022    Screening Current   Cervical Cancer Screening Between the ages of 21-29, pap smear recommended once every 3 years. Between the ages of 32-69, can perform pap smear with HPV co-testing every 5 years.    Recommendations may differ for women with a history of total hysterectomy, cervical cancer, or abnormal pap smears in past. Pap Smear: Not on file    Screening Not Indicated   Hepatitis C Screening Once for adults born between 1945 and 1965  More frequently in patients at high risk for Hepatitis C Hep C Antibody: 05/26/2021    Screening Current   Diabetes Screening 1-2 times per year if you're at risk for diabetes or have pre-diabetes Fasting glucose: No results in last 5 years (No results in last 5 years)  A1C: No results in last 5 years (No results in last 5 years)      Cholesterol Screening Once every 5 years if you don't have a lipid disorder. May order more often based on risk factors. Lipid panel: 08/29/2022    Screening Not Indicated  History Lipid Disorder     Other Preventive Screenings Covered by Medicare:  Abdominal Aortic Aneurysm (AAA) Screening: covered once if your at risk. You're considered to be at risk if you have a family history of AAA. Lung Cancer Screening: covers low dose CT scan once per year if you meet all of the following conditions: (1) Age 48-67; (2) No signs or symptoms of lung cancer; (3) Current smoker or have quit smoking within the last 15 years; (4) You have a tobacco smoking history of at least 20 pack years (packs per day multiplied by number of years you smoked); (5) You get a written order from a healthcare provider. Glaucoma Screening: covered annually if you're considered high risk: (1) You have diabetes OR (2) Family history of glaucoma OR (3)  aged 48 and older OR (3)  American aged 72 and older  Osteoporosis Screening: covered every 2 years if you meet one of the following conditions: (1) You're estrogen deficient and at risk for osteoporosis based off medical history and other findings; (2) Have a vertebral abnormality; (3) On glucocorticoid therapy for more than 3 months; (4) Have primary hyperparathyroidism; (5) On osteoporosis medications and need to assess response to drug therapy. Last bone density test (DXA Scan): 10/18/2021. HIV Screening: covered annually if you're between the age of 14-79. Also covered annually if you are younger than 13 and older than 72 with risk factors for HIV infection. For pregnant patients, it is covered up to 3 times per pregnancy. Immunizations:  Immunization Recommendations   Influenza Vaccine Annual influenza vaccination during flu season is recommended for all persons aged >= 6 months who do not have contraindications   Pneumococcal Vaccine   * Pneumococcal conjugate vaccine = PCV13 (Prevnar 13), PCV15 (Vaxneuvance), PCV20 (Prevnar 20)  * Pneumococcal polysaccharide vaccine = PPSV23 (Pneumovax) Adults 20-63 years old: 1-3 doses may be recommended based on certain risk factors  Adults 72 years old: 1-2 doses may be recommended based off what pneumonia vaccine you previously received   Hepatitis B Vaccine 3 dose series if at intermediate or high risk (ex: diabetes, end stage renal disease, liver disease)   Tetanus (Td) Vaccine - COST NOT COVERED BY MEDICARE PART B Following completion of primary series, a booster dose should be given every 10 years to maintain immunity against tetanus. Td may also be given as tetanus wound prophylaxis. Tdap Vaccine - COST NOT COVERED BY MEDICARE PART B Recommended at least once for all adults. For pregnant patients, recommended with each pregnancy. Shingles Vaccine (Shingrix) - COST NOT COVERED BY MEDICARE PART B  2 shot series recommended in those aged 48 and above     Health Maintenance Due:      Topic Date Due    Colorectal Cancer Screening  09/17/2022    Breast Cancer Screening: Mammogram  11/02/2023    DXA SCAN  10/18/2026    Hepatitis C Screening  Completed     Immunizations Due:      Topic Date Due    Pneumococcal Vaccine: 65+ Years (1 - PCV) Never done    COVID-19 Vaccine (3 - Moderna series) 05/26/2021    Influenza Vaccine (1) 09/01/2023     Advance Directives   What are advance directives?   Advance directives are legal documents that state your wishes and plans for medical care. These plans are made ahead of time in case you lose your ability to make decisions for yourself. Advance directives can apply to any medical decision, such as the treatments you want, and if you want to donate organs. What are the types of advance directives? There are many types of advance directives, and each state has rules about how to use them. You may choose a combination of any of the following:  Living will: This is a written record of the treatment you want. You can also choose which treatments you do not want, which to limit, and which to stop at a certain time. This includes surgery, medicine, IV fluid, and tube feedings. Durable power of  for [facility]): This is a written record that states who you want to make healthcare choices for you when you are unable to make them for yourself. This person, called a proxy, is usually a family member or a friend. You may choose more than 1 proxy. Do not resuscitate (DNR) order:  A DNR order is used in case your heart stops beating or you stop breathing. It is a request not to have certain forms of treatment, such as CPR. A DNR order may be included in other types of advance directives. Medical directive: This covers the care that you want if you are in a coma, near death, or unable to make decisions for yourself. You can list the treatments you want for each condition. Treatment may include pain medicine, surgery, blood transfusions, dialysis, IV or tube feedings, and a ventilator (breathing machine). Values history: This document has questions about your views, beliefs, and how you feel and think about life. This information can help others choose the care that you would choose. Why are advance directives important? An advance directive helps you control your care. Although spoken wishes may be used, it is better to have your wishes written down.  Spoken wishes can be misunderstood, or not followed. Treatments may be given even if you do not want them. An advance directive may make it easier for your family to make difficult choices about your care. Underweight  Underweight is defined as having a body mass index (BMI) of less than 18.5 kg/m2   Anorexia  is a loss of appetite, decreased food intake, or both. Your appetite naturally decreases as you get older. You also get full faster than you used to. This occurs because your body needs less energy. Other body changes can also lead to a decreased appetite. Even though some appetite loss is normal, you still need to get enough calories and nutrients to keep you healthy. You can start to lose too much weight if you do not eat as much food as your body needs. Unwanted weight loss can cause health problems, or worsen health problems you already have. You can also become dehydrated if you do not drink enough liquid. How to eat healthy and get enough nutrients:   Choose healthy foods. Eat a variety of fruits, vegetables, whole grains, low-fat dairy foods, lean meats, and other protein foods. Limit foods high in fat, sugar, and salt. Limit or avoid alcohol as directed. Work with a dietitian to help you plan your meals if you need to follow a special diet. A dietitian can also teach you how to modify foods if you have trouble chewing or swallowing. Snack on healthy foods between meals  if you only eat a small amount during meals. Snacks provide extra healthy nutrients and calories between meals. Examples include fruit, cheese, and whole grain crackers. Drink liquids as directed  to avoid dehydration. Drink liquids between meals if they cause you to get full too quickly during meals. Ask how much liquid to drink each day and which liquids are best for you. Use herbs, spices, and flavor enhancers to add flavor to foods. Avoid using herbs and spice blends that also contain sodium.  Ask your healthcare provider or dietitian about flavor enhancers. Flavor enhancers with ham, natural esposito, and roast beef flavors can also be sprinkled on food to add flavor. Share meals with others as often as you can. Eating with others may help you to eat better during meal time. Ask family members, neighbors, or friends to join you for lunch. There are also senior centers where you can meet people, and share meals with them. Ask family and friends for help  with shopping or preparing foods. Ask for a ride to the grocery store, if needed. © Copyright [employer] 2018 Information is for End User's use only and may not be sold, redistributed or otherwise used for commercial purposes.  All illustrations and images included in CareNotes® are the copyrighted property of [employer].D.A[employer]M., Inc. or [address] [place] [address]

## 2023-09-12 ENCOUNTER — TELEPHONE (OUTPATIENT)
Dept: ADMINISTRATIVE | Facility: OTHER | Age: 68
End: 2023-09-12

## 2023-09-12 NOTE — TELEPHONE ENCOUNTER
----- Message from Ellie Verdugo sent at 9/11/2023  6:32 PM EDT -----  Regarding: Colonoscopy - [facility]  09/11/23 6:32 PM    Hello, our patient attached above has had CRC: Colonoscopy completed/performed. Please assist in updating the patient chart by making an External outreach to Dr. Bridgette Salinas, facility located in [address] [phone] (phone) The date of service is 2022.     Thank you,  [address] [facility] [address] [facility] [address]

## 2023-09-12 NOTE — LETTER
Procedure Request Form: Colonoscopy      Date Requested: 23  Patient: Aristides Suazo  Patient : 1955   Referring Provider: No primary care provider on file. Date of Procedure ______________________________       The above patient has informed us that they have completed their   most recent Colonoscopy at your facility. Please complete   this form and attach all corresponding procedure reports/results. Comments __________________________________________________________  ____________________________________________________________________  ____________________________________________________________________  ____________________________________________________________________    Facility Completing Procedure _________________________________________    Form Completed By (print name) _______________________________________      Signature __________________________________________________________      These reports are needed for  compliance. Please fax this completed form and a copy of the procedure report to our office located at [address] [place] [address] as soon as possible to Fax [phone] jensen Rojas: Phone [phone]    We thank you for your assistance in treating our mutual patient.

## 2023-09-12 NOTE — TELEPHONE ENCOUNTER
Upon review of the In Basket request and the patient's chart, initial outreach has been made via fax to facility. Please see Contacts section for details.      Thank you  Vasu Burk MA

## 2023-09-13 ENCOUNTER — TELEPHONE (OUTPATIENT)
Dept: FAMILY MEDICINE CLINIC | Facility: CLINIC | Age: 68
End: 2023-09-13

## 2023-09-13 NOTE — TELEPHONE ENCOUNTER
I have documented when we ordered an Echo for Brenda in 2021, she refused because she had one done at [facility] ER just before. I spoke to Mena Thomason at [facility]. Mena Thomason agreed to fax an echo and ekg from 2021.

## 2023-09-14 NOTE — TELEPHONE ENCOUNTER
I verbally spoke to Dr Baldomero Freeman. He reviewed Jackie's echo from 5/28/2021. He states she is not due this year, but will be due next year.

## 2023-09-17 PROCEDURE — 93000 ELECTROCARDIOGRAM COMPLETE: CPT | Performed by: FAMILY MEDICINE

## 2023-09-19 NOTE — TELEPHONE ENCOUNTER
Upon review of the In Basket request we were able to locate, review, and update the patient chart as requested for CRC: Colonoscopy. Any additional questions or concerns should be emailed to the Practice Liaisons via the appropriate education email address, please do not reply via In Basket.     Thank you  Claudia Hurt MA

## 2023-12-13 DIAGNOSIS — Z12.31 ENCOUNTER FOR SCREENING MAMMOGRAM FOR MALIGNANT NEOPLASM OF BREAST: ICD-10-CM

## 2023-12-15 DIAGNOSIS — Z78.0 ASYMPTOMATIC MENOPAUSE: ICD-10-CM

## 2023-12-15 DIAGNOSIS — Z13.820 SCREENING FOR OSTEOPOROSIS: ICD-10-CM

## 2023-12-18 ENCOUNTER — TELEPHONE (OUTPATIENT)
Dept: FAMILY MEDICINE CLINIC | Facility: CLINIC | Age: 68
End: 2023-12-18

## 2023-12-18 NOTE — TELEPHONE ENCOUNTER
----- Message from Josue Monk MD sent at 12/17/2023  2:09 PM EST -----  PLEASE CALL [facility] BONE DENSITY    SHOWED SOME MILD BONE DENSITY LOSS - OSTEOPENIA    RECOMMEND DAILY CALCIUM SUPPLEMENTATION - 5499-4104 MG DAILY  VITAMIN D3 2000 IU DAILY    REPEAT DEXA SCAN IN 2 YEARS

## 2024-02-01 DIAGNOSIS — K21.9 GERD WITHOUT ESOPHAGITIS: ICD-10-CM

## 2024-02-01 DIAGNOSIS — E78.2 MIXED HYPERLIPIDEMIA: Primary | ICD-10-CM

## 2024-02-01 DIAGNOSIS — I10 PRIMARY HYPERTENSION: ICD-10-CM

## 2024-02-01 NOTE — PROGRESS NOTES
"Sharyn Avendano RN    " Follow up blood work, [facility]

## 2024-03-02 LAB
ALBUMIN SERPL-MCNC: 4.3 G/DL (ref 3.9–4.9)
ALBUMIN/GLOB SERPL: 2 {RATIO} (ref 1.2–2.2)
ALP SERPL-CCNC: 96 IU/L (ref 44–121)
ALT SERPL-CCNC: 17 IU/L (ref 0–32)
AST SERPL-CCNC: 31 IU/L (ref 0–40)
BASOPHILS # BLD AUTO: 0.1 X10E3/UL (ref 0–0.2)
BASOPHILS NFR BLD AUTO: 0 %
BILIRUB SERPL-MCNC: <0.2 MG/DL (ref 0–1.2)
BUN SERPL-MCNC: 34 MG/DL (ref 8–27)
BUN/CREAT SERPL: 32 (ref 12–28)
CALCIUM SERPL-MCNC: 9.6 MG/DL (ref 8.7–10.3)
CHLORIDE SERPL-SCNC: 100 MMOL/L (ref 96–106)
CHOLEST SERPL-MCNC: 228 MG/DL (ref 100–199)
CHOLEST/HDLC SERPL: 2.6 RATIO (ref 0–4.4)
CO2 SERPL-SCNC: 23 MMOL/L (ref 20–29)
CREAT SERPL-MCNC: 1.06 MG/DL (ref 0.57–1)
EGFR: 57 ML/MIN/1.73
EOSINOPHIL # BLD AUTO: 0 X10E3/UL (ref 0–0.4)
EOSINOPHIL NFR BLD AUTO: 0 %
ERYTHROCYTE [DISTWIDTH] IN BLOOD BY AUTOMATED COUNT: 14.9 % (ref 11.7–15.4)
GLOBULIN SER-MCNC: 2.1 G/DL (ref 1.5–4.5)
GLUCOSE SERPL-MCNC: 91 MG/DL (ref 70–99)
HCT VFR BLD AUTO: 35.8 % (ref 34–46.6)
HDLC SERPL-MCNC: 87 MG/DL
HGB BLD-MCNC: 11.2 G/DL (ref 11.1–15.9)
IMM GRANULOCYTES # BLD: 0.2 X10E3/UL (ref 0–0.1)
IMM GRANULOCYTES NFR BLD: 1 %
LDLC SERPL CALC-MCNC: 128 MG/DL (ref 0–99)
LYMPHOCYTES # BLD AUTO: 0.6 X10E3/UL (ref 0.7–3.1)
LYMPHOCYTES NFR BLD AUTO: 3 %
MCH RBC QN AUTO: 27.7 PG (ref 26.6–33)
MCHC RBC AUTO-ENTMCNC: 31.3 G/DL (ref 31.5–35.7)
MCV RBC AUTO: 88 FL (ref 79–97)
MONOCYTES # BLD AUTO: 0.9 X10E3/UL (ref 0.1–0.9)
MONOCYTES NFR BLD AUTO: 4 %
NEUTROPHILS # BLD AUTO: 17.7 X10E3/UL (ref 1.4–7)
NEUTROPHILS NFR BLD AUTO: 92 %
PLATELET # BLD AUTO: 449 X10E3/UL (ref 150–450)
POTASSIUM SERPL-SCNC: 5 MMOL/L (ref 3.5–5.2)
PROT SERPL-MCNC: 6.4 G/DL (ref 6–8.5)
RBC # BLD AUTO: 4.05 X10E6/UL (ref 3.77–5.28)
SL AMB VLDL CHOLESTEROL CALC: 13 MG/DL (ref 5–40)
SODIUM SERPL-SCNC: 140 MMOL/L (ref 134–144)
TRIGL SERPL-MCNC: 77 MG/DL (ref 0–149)
WBC # BLD AUTO: 19.4 X10E3/UL (ref 3.4–10.8)

## 2024-03-11 ENCOUNTER — OFFICE VISIT (OUTPATIENT)
Dept: FAMILY MEDICINE CLINIC | Facility: CLINIC | Age: 69
End: 2024-03-11
Payer: COMMERCIAL

## 2024-03-11 VITALS
BODY MASS INDEX: 17.75 KG/M2 | DIASTOLIC BLOOD PRESSURE: 64 MMHG | HEIGHT: 64 IN | RESPIRATION RATE: 16 BRPM | TEMPERATURE: 97.7 F | HEART RATE: 112 BPM | WEIGHT: 104 LBS | SYSTOLIC BLOOD PRESSURE: 144 MMHG | OXYGEN SATURATION: 97 %

## 2024-03-11 DIAGNOSIS — D72.829 LEUKOCYTOSIS, UNSPECIFIED TYPE: ICD-10-CM

## 2024-03-11 DIAGNOSIS — I10 PRIMARY HYPERTENSION: Primary | ICD-10-CM

## 2024-03-11 DIAGNOSIS — K21.9 GERD WITHOUT ESOPHAGITIS: ICD-10-CM

## 2024-03-11 DIAGNOSIS — N18.31 STAGE 3A CHRONIC KIDNEY DISEASE (HCC): ICD-10-CM

## 2024-03-11 DIAGNOSIS — E03.9 HYPOTHYROIDISM, UNSPECIFIED TYPE: ICD-10-CM

## 2024-03-11 DIAGNOSIS — E78.2 MIXED HYPERLIPIDEMIA: ICD-10-CM

## 2024-03-11 PROCEDURE — G2211 COMPLEX E/M VISIT ADD ON: HCPCS | Performed by: FAMILY MEDICINE

## 2024-03-11 PROCEDURE — 99214 OFFICE O/P EST MOD 30 MIN: CPT | Performed by: FAMILY MEDICINE

## 2024-03-11 NOTE — PROGRESS NOTES
Name: Jackie Suazo      : 1955      MRN: [id number]  Encounter Provider: Josue Monk MD  Encounter Date: 3/11/2024   Encounter department: [facility]    Assessment & Plan     1. Primary hypertension  Assessment & Plan:  STABLE  DENIES ANY CP, SOB, PALPITATIONS, OR HEADACHE  NOTES NO WATER RETENTION  COMPLIANT WITH MEDICATION  NO CONCERNS    - CONTINUE CURRENT TREATMENT PLAN  - MONITOR DIETARY SODIUM INTAKE  - ENCOURAGE PHYSICAL ACTIVITY  - RV 3 MONTHS        2. Mixed hyperlipidemia  Assessment & Plan:  WATCHING CHOLESTEROL INTAKE  COMPLIANT WITH MEDICATION  NO CONCERNS    - CONTINUE TO MONITOR DIETARY CHOL INTAKE  - CONTINUE CURRENT MEDICATION  - ENCOURAGED PHYSICAL ACTIVITY        3. Hypothyroidism, unspecified type  Assessment & Plan:  STABLE      4. GERD without esophagitis  Assessment & Plan:  STABLE  DENIES ANY CP, INDIGESTION, OR COUGH  NOTES NO MELENA OR HEMATOCHEZIA  NO HEMATEMESIS  COMPLIANT WITH MEDICATION  NO CONCERNS    - CONTINUE CURRENT TREATMENT PLAN  - MEDICATION AS PRESCRIBED  - AVOID CAFFEINE, ALCOHOL OR SMOKING        5. Stage 3a chronic kidney disease (HCC)    6. Leukocytosis, unspecified type  -     CBC and differential; Future  -     CBC and differential           Subjective      PATIENT RETURNS FOR ROUTINE EVALUATION OF PATIENT'S MEDICAL ISSUES    INDIVIDUAL MEDICAL ISSUES WITH THEIR CURRENT STATUS, ASSESSMENT AND PLANS ARE LISTED ABOVE            Review of Systems   Constitutional:  Negative for chills, fatigue and fever.   HENT:  Negative for congestion, ear discharge, ear pain, mouth sores, postnasal drip, sore throat and trouble swallowing.    Eyes:  Negative for pain, discharge and visual disturbance.   Respiratory:  Negative for cough, shortness of breath and wheezing.    Cardiovascular:  Negative for chest pain, palpitations and leg swelling.   Gastrointestinal:  Negative for abdominal distention, abdominal pain, blood in stool, diarrhea  "and nausea.   Endocrine: Negative for polydipsia, polyphagia and polyuria.   Genitourinary:  Negative for dysuria, frequency, hematuria and urgency.   Musculoskeletal:  Negative for arthralgias, gait problem and joint swelling.   Skin:  Negative for pallor and rash.   Neurological:  Negative for dizziness, syncope, speech difficulty, weakness, light-headedness, numbness and headaches.   Hematological:  Negative for adenopathy.   Psychiatric/Behavioral:  Negative for behavioral problems, confusion and sleep disturbance. The patient is not nervous/anxious.        Current Outpatient Medications on File Prior to Visit   Medication Sig   • amLODIPine-benazepril (LOTREL) 10-40 MG per capsule TAKE 1 CAPSULE BY MOUTH  DAILY   • Calcium-Magnesium-Vitamin D (CALCIUM 1200+D3 PO) Take by mouth in the morning   • ferric carboxymaltose (Injectafer) injection infuse 750 mg by intravenous route days 1 & 8; may repeat in 4-6 weeks   • Ferrous Sulfate (IRON PO) Take by mouth 3 (three) times a week   • levothyroxine 50 mcg tablet TAKE 1 TABLET BY MOUTH  DAILY   • multivitamin (THERAGRAN) TABS Take 1 tablet by mouth daily   • omeprazole (PriLOSEC) 40 MG capsule        Objective     /64   Pulse (!) 112   Temp 97.7 °F (36.5 °C) (Temporal)   Resp 16   Ht 5' 4\" (1.626 m)   Wt 47.2 kg (104 lb)   SpO2 97%   BMI 17.85 kg/m²     Physical Exam  Constitutional:       General: She is not in acute distress.     Appearance: Normal appearance. She is well-developed and normal weight. She is not ill-appearing.   HENT:      Head: Normocephalic and atraumatic.   Eyes:      General:         Right eye: No discharge.         Left eye: No discharge.      Conjunctiva/sclera: Conjunctivae normal.      Pupils: Pupils are equal, round, and reactive to light.   Neck:      Thyroid: No thyromegaly.   Cardiovascular:      Rate and Rhythm: Normal rate and regular rhythm.      Heart sounds: Normal heart sounds. No murmur heard.  Pulmonary:      Effort: " Pulmonary effort is normal. No respiratory distress.      Breath sounds: Normal breath sounds. No wheezing or rales.   Abdominal:      General: Bowel sounds are normal.      Palpations: Abdomen is soft.      Tenderness: There is no abdominal tenderness.   Musculoskeletal:         General: No tenderness. Normal range of motion.      Cervical back: Normal range of motion and neck supple.   Lymphadenopathy:      Cervical: No cervical adenopathy.   Skin:     General: Skin is warm and dry.      Findings: No erythema or rash.   Neurological:      General: No focal deficit present.      Mental Status: She is alert and oriented to person, place, and time.   Psychiatric:         Mood and Affect: Mood normal.         Behavior: Behavior normal.         Thought Content: Thought content normal.         Judgment: Judgment normal.       Josue Monk MD

## 2024-03-11 NOTE — LETTER
MARY KAY      Current Outpatient Medications:     amLODIPine-benazepril (LOTREL) 10-40 MG per capsule, TAKE 1 CAPSULE BY MOUTH  DAILY, Disp: 90 capsule, Rfl: 3    Calcium-Magnesium-Vitamin D (CALCIUM 1200+D3 PO), Take by mouth in the morning, Disp: , Rfl:     ferric carboxymaltose (Injectafer) injection, infuse 750 mg by intravenous route days 1 & 8; may repeat in 4-6 weeks, Disp: , Rfl:     Ferrous Sulfate (IRON PO), Take by mouth 3 (three) times a week, Disp: , Rfl:     levothyroxine 50 mcg tablet, TAKE 1 TABLET BY MOUTH  DAILY, Disp: 90 tablet, Rfl: 3    multivitamin (THERAGRAN) TABS, Take 1 tablet by mouth daily, Disp: , Rfl:     omeprazole (PriLOSEC) 40 MG capsule, , Disp: , Rfl:       Recent Results (from the past 672 hour(s))   Comprehensive metabolic panel    Collection Time: 03/01/24 11:22 AM   Result Value Ref Range    Glucose, Random 91 70 - 99 mg/dL    BUN 34 (H) 8 - 27 mg/dL    Creatinine 1.06 (H) 0.57 - 1.00 mg/dL    eGFR 57 (L) >59 mL/min/1.73    SL AMB BUN/CREATININE RATIO 32 (H) 12 - 28    Sodium 140 134 - 144 mmol/L    Potassium 5.0 3.5 - 5.2 mmol/L    Chloride 100 96 - 106 mmol/L    CO2 23 20 - 29 mmol/L    CALCIUM 9.6 8.7 - 10.3 mg/dL    Protein, Total 6.4 6.0 - 8.5 g/dL    Albumin 4.3 3.9 - 4.9 g/dL    Globulin, Total 2.1 1.5 - 4.5 g/dL    Albumin/Globulin Ratio 2.0 1.2 - 2.2    TOTAL BILIRUBIN <0.2 0.0 - 1.2 mg/dL    Alk Phos Isoenzymes 96 44 - 121 IU/L    AST 31 0 - 40 IU/L    ALT 17 0 - 32 IU/L   CBC and differential    Collection Time: 03/01/24 11:22 AM   Result Value Ref Range    White Blood Cell Count 19.4 (H) 3.4 - 10.8 x10E3/uL    Red Blood Cell Count 4.05 3.77 - 5.28 x10E6/uL    Hemoglobin 11.2 11.1 - 15.9 g/dL    HCT 35.8 34.0 - 46.6 %    MCV 88 79 - 97 fL    MCH 27.7 26.6 - 33.0 pg    MCHC 31.3 (L) 31.5 - 35.7 g/dL    RDW 14.9 11.7 - 15.4 %    Platelet Count 449 150 - 450 x10E3/uL    Neutrophils 92 Not Estab. %    Lymphocytes 3 Not Estab. %    Monocytes 4 Not Estab. %     Eosinophils 0 Not Estab. %    Basophils PCT 0 Not Estab. %    Neutrophils (Absolute) 17.7 (H) 1.4 - 7.0 x10E3/uL    Lymphocytes (Absolute) 0.6 (L) 0.7 - 3.1 x10E3/uL    Monocytes (Absolute) 0.9 0.1 - 0.9 x10E3/uL    Eosinophils (Absolute) 0.0 0.0 - 0.4 x10E3/uL    Basophils ABS 0.1 0.0 - 0.2 x10E3/uL    Immature Granulocytes 1 Not Estab. %    Immature Granulocytes (Absolute) 0.2 (H) 0.0 - 0.1 x10E3/uL   Lipid panel    Collection Time: 03/01/24 11:22 AM   Result Value Ref Range    Cholesterol, Total 228 (H) 100 - 199 mg/dL    Triglycerides 77 0 - 149 mg/dL    HDL 87 >39 mg/dL    VLDL Cholesterol Calculated 13 5 - 40 mg/dL    LDL Calculated 128 (H) 0 - 99 mg/dL    T. Chol/HDL Ratio 2.6 0.0 - 4.4 ratio

## 2024-03-11 NOTE — PATIENT INSTRUCTIONS
CONTINUE CURRENT TREATMENT PLAN  MONITOR DIETARY SODIUM, CHOL INTAKE  ENCOURAGE PHYSICAL ACTIVITY    HEMATOLOGY CONSULT        RV 6 M, SOONER PRN      Recent Results (from the past 672 hour(s))   Comprehensive metabolic panel    Collection Time: 03/01/24 11:22 AM   Result Value Ref Range    Glucose, Random 91 70 - 99 mg/dL    BUN 34 (H) 8 - 27 mg/dL    Creatinine 1.06 (H) 0.57 - 1.00 mg/dL    eGFR 57 (L) >59 mL/min/1.73    SL AMB BUN/CREATININE RATIO 32 (H) 12 - 28    Sodium 140 134 - 144 mmol/L    Potassium 5.0 3.5 - 5.2 mmol/L    Chloride 100 96 - 106 mmol/L    CO2 23 20 - 29 mmol/L    CALCIUM 9.6 8.7 - 10.3 mg/dL    Protein, Total 6.4 6.0 - 8.5 g/dL    Albumin 4.3 3.9 - 4.9 g/dL    Globulin, Total 2.1 1.5 - 4.5 g/dL    Albumin/Globulin Ratio 2.0 1.2 - 2.2    TOTAL BILIRUBIN <0.2 0.0 - 1.2 mg/dL    Alk Phos Isoenzymes 96 44 - 121 IU/L    AST 31 0 - 40 IU/L    ALT 17 0 - 32 IU/L   CBC and differential    Collection Time: 03/01/24 11:22 AM   Result Value Ref Range    White Blood Cell Count 19.4 (H) 3.4 - 10.8 x10E3/uL    Red Blood Cell Count 4.05 3.77 - 5.28 x10E6/uL    Hemoglobin 11.2 11.1 - 15.9 g/dL    HCT 35.8 34.0 - 46.6 %    MCV 88 79 - 97 fL    MCH 27.7 26.6 - 33.0 pg    MCHC 31.3 (L) 31.5 - 35.7 g/dL    RDW 14.9 11.7 - 15.4 %    Platelet Count 449 150 - 450 x10E3/uL    Neutrophils 92 Not Estab. %    Lymphocytes 3 Not Estab. %    Monocytes 4 Not Estab. %    Eosinophils 0 Not Estab. %    Basophils PCT 0 Not Estab. %    Neutrophils (Absolute) 17.7 (H) 1.4 - 7.0 x10E3/uL    Lymphocytes (Absolute) 0.6 (L) 0.7 - 3.1 x10E3/uL    Monocytes (Absolute) 0.9 0.1 - 0.9 x10E3/uL    Eosinophils (Absolute) 0.0 0.0 - 0.4 x10E3/uL    Basophils ABS 0.1 0.0 - 0.2 x10E3/uL    Immature Granulocytes 1 Not Estab. %    Immature Granulocytes (Absolute) 0.2 (H) 0.0 - 0.1 x10E3/uL   Lipid panel    Collection Time: 03/01/24 11:22 AM   Result Value Ref Range    Cholesterol, Total 228 (H) 100 - 199 mg/dL    Triglycerides 77 0 - 149 mg/dL     HDL 87 >39 mg/dL    VLDL Cholesterol Calculated 13 5 - 40 mg/dL    LDL Calculated 128 (H) 0 - 99 mg/dL    T. Chol/HDL Ratio 2.6 0.0 - 4.4 ratio

## 2024-03-22 LAB
BASOPHILS # BLD AUTO: 0.1 X10E3/UL (ref 0–0.2)
BASOPHILS NFR BLD AUTO: 1 %
EOSINOPHIL # BLD AUTO: 0 X10E3/UL (ref 0–0.4)
EOSINOPHIL NFR BLD AUTO: 0 %
ERYTHROCYTE [DISTWIDTH] IN BLOOD BY AUTOMATED COUNT: 14 % (ref 11.7–15.4)
HCT VFR BLD AUTO: 34.9 % (ref 34–46.6)
HGB BLD-MCNC: 11.3 G/DL (ref 11.1–15.9)
IMM GRANULOCYTES # BLD: 0.1 X10E3/UL (ref 0–0.1)
IMM GRANULOCYTES NFR BLD: 1 %
LYMPHOCYTES # BLD AUTO: 2.1 X10E3/UL (ref 0.7–3.1)
LYMPHOCYTES NFR BLD AUTO: 20 %
MCH RBC QN AUTO: 28 PG (ref 26.6–33)
MCHC RBC AUTO-ENTMCNC: 32.4 G/DL (ref 31.5–35.7)
MCV RBC AUTO: 87 FL (ref 79–97)
MONOCYTES # BLD AUTO: 0.8 X10E3/UL (ref 0.1–0.9)
MONOCYTES NFR BLD AUTO: 7 %
NEUTROPHILS # BLD AUTO: 7.2 X10E3/UL (ref 1.4–7)
NEUTROPHILS NFR BLD AUTO: 71 %
PLATELET # BLD AUTO: 563 X10E3/UL (ref 150–450)
RBC # BLD AUTO: 4.03 X10E6/UL (ref 3.77–5.28)
WBC # BLD AUTO: 10.1 X10E3/UL (ref 3.4–10.8)

## 2024-05-13 DIAGNOSIS — E03.9 ACQUIRED HYPOTHYROIDISM: ICD-10-CM

## 2024-05-15 RX ORDER — LEVOTHYROXINE SODIUM 0.05 MG/1
TABLET ORAL
Qty: 100 TABLET | Refills: 2 | Status: SHIPPED | OUTPATIENT
Start: 2024-05-15

## 2024-05-15 RX ORDER — AMLODIPINE AND BENAZEPRIL HYDROCHLORIDE 10; 40 MG/1; MG/1
1 CAPSULE ORAL DAILY
Qty: 100 CAPSULE | Refills: 2 | Status: SHIPPED | OUTPATIENT
Start: 2024-05-15

## 2024-07-15 ENCOUNTER — OFFICE VISIT (OUTPATIENT)
Dept: FAMILY MEDICINE CLINIC | Facility: CLINIC | Age: 69
End: 2024-07-15
Payer: COMMERCIAL

## 2024-07-15 VITALS
TEMPERATURE: 97.6 F | DIASTOLIC BLOOD PRESSURE: 38 MMHG | OXYGEN SATURATION: 99 % | WEIGHT: 101 LBS | RESPIRATION RATE: 12 BRPM | BODY MASS INDEX: 17.24 KG/M2 | HEART RATE: 104 BPM | HEIGHT: 64 IN | SYSTOLIC BLOOD PRESSURE: 100 MMHG

## 2024-07-15 DIAGNOSIS — E86.1 HYPOTENSION DUE TO HYPOVOLEMIA: ICD-10-CM

## 2024-07-15 DIAGNOSIS — I10 PRIMARY HYPERTENSION: Primary | ICD-10-CM

## 2024-07-15 PROCEDURE — 99213 OFFICE O/P EST LOW 20 MIN: CPT | Performed by: NURSE PRACTITIONER

## 2024-07-15 PROCEDURE — G2211 COMPLEX E/M VISIT ADD ON: HCPCS | Performed by: NURSE PRACTITIONER

## 2024-07-15 RX ORDER — CIPROFLOXACIN 500 MG/1
500 TABLET, FILM COATED ORAL EVERY 12 HOURS SCHEDULED
COMMUNITY

## 2024-07-15 RX ORDER — UREA 10 %
45 LOTION (ML) TOPICAL DAILY
COMMUNITY

## 2024-07-15 NOTE — PROGRESS NOTES
Assessment/Plan:    1. Primary hypertension  Comments:  HOLD BP medications. Recheck in 3-4 days. Treat UTI as directed with antibiotics.  2. Hypotension due to hypovolemia  Comments:  Encourage increase in fluid intake as tolerated.            Return in about 4 days (around 7/19/2024) for Recheck.    Subjective:      Patient ID: Jackie Suazo is a 69 y.o. female.    Chief Complaint   Patient presents with    Blood Pressure Check     Pt went to [facility] for low bp 100/40 & blood in urine. [facility] told her to see her pcp or ER since they didnt have a baseline on her.       Pt reports that she has not eaten anything for 4 days. Reports small meals but nothing substantial due to decreased appetitive. Reports that she was having chills during the night without night sweats. Reports that she is under 3 blankets due to her chills. Reports slightly headache in the back of her neck. Lightheaded. Denies fever, chills, chest pain, shortness of breath. Reports that she had a fever of 100.3 about 2 days ago. Reports that she has been taking motrin which helps to relieve the fever. Pt is currently being treated for UTI and took her first antibiotic dose this morning.         The following portions of the patient's history were reviewed and updated as appropriate: allergies, current medications, past family history, past medical history, past social history, past surgical history and problem list.    Review of Systems   Constitutional:  Negative for chills, fatigue and fever.   Respiratory:  Negative for cough, chest tightness and shortness of breath.    Cardiovascular:  Negative for chest pain.         Current Outpatient Medications   Medication Sig Dispense Refill    amLODIPine-benazepril (LOTREL) 10-40 MG per capsule TAKE 1 CAPSULE BY MOUTH DAILY 100 capsule 2    Calcium-Magnesium-Vitamin D (CALCIUM 1200+D3 PO) Take by mouth in the morning      ciprofloxacin (CIPRO) 500 mg tablet Take 500 mg by mouth every  "12 (twelve) hours      ferric carboxymaltose (Injectafer) injection infuse 750 mg by intravenous route days 1 & 8; may repeat in 4-6 weeks      Ferrous Sulfate ER (Slow Fe) 45 MG TBCR Take 45 mg by mouth daily Pt takes 1/2 pill in AM, 1/2 pill in PM      levothyroxine 50 mcg tablet TAKE 1 TABLET BY MOUTH DAILY 100 tablet 2    multivitamin (THERAGRAN) TABS Take 1 tablet by mouth daily      omeprazole (PriLOSEC) 40 MG capsule Take 40 mg by mouth daily      Ferrous Sulfate (IRON PO) Take by mouth 3 (three) times a week (Patient not taking: Reported on 7/15/2024)       No current facility-administered medications for this visit.       Objective:    BP (!) 100/38 (BP Location: Right arm, Patient Position: Sitting, Cuff Size: Standard)   Pulse 104   Temp 97.6 °F (36.4 °C) (Temporal)   Resp 12   Ht 5' 4\" (1.626 m)   Wt 45.8 kg (101 lb)   SpO2 99%   BMI 17.34 kg/m²        Physical Exam  Vitals reviewed.   Constitutional:       Appearance: Normal appearance.   HENT:      Head: Normocephalic and atraumatic.   Cardiovascular:      Rate and Rhythm: Normal rate and regular rhythm.      Heart sounds: Normal heart sounds.   Pulmonary:      Effort: Pulmonary effort is normal.      Breath sounds: Normal breath sounds.   Neurological:      Mental Status: She is alert and oriented to person, place, and time.   Psychiatric:         Mood and Affect: Mood normal.                PRICE Vasquez  "

## 2024-07-16 NOTE — PROGRESS NOTES
Spoke to Suyapa at [facility].  Last Echo was 2021.  She will fax the Echo.    Caterina Wahl  /[place]

## 2024-07-19 ENCOUNTER — OFFICE VISIT (OUTPATIENT)
Dept: FAMILY MEDICINE CLINIC | Facility: CLINIC | Age: 69
End: 2024-07-19
Payer: COMMERCIAL

## 2024-07-19 VITALS
HEART RATE: 104 BPM | WEIGHT: 101 LBS | DIASTOLIC BLOOD PRESSURE: 60 MMHG | OXYGEN SATURATION: 99 % | HEIGHT: 64 IN | TEMPERATURE: 98 F | BODY MASS INDEX: 17.24 KG/M2 | RESPIRATION RATE: 18 BRPM | SYSTOLIC BLOOD PRESSURE: 136 MMHG

## 2024-07-19 DIAGNOSIS — I10 PRIMARY HYPERTENSION: Primary | ICD-10-CM

## 2024-07-19 DIAGNOSIS — E86.1 HYPOTENSION DUE TO HYPOVOLEMIA: ICD-10-CM

## 2024-07-19 PROCEDURE — G2211 COMPLEX E/M VISIT ADD ON: HCPCS | Performed by: NURSE PRACTITIONER

## 2024-07-19 PROCEDURE — 99213 OFFICE O/P EST LOW 20 MIN: CPT | Performed by: NURSE PRACTITIONER

## 2024-07-19 NOTE — PROGRESS NOTES
Assessment/Plan:    1. Primary hypertension  Assessment & Plan:  BP improved in office today.   Continue to hold BP medications.  Recheck 4-5 days.  Pt reports she is feeling improved overall.   2. Hypotension due to hypovolemia  Assessment & Plan:  Continue to HOLD amlodipine at this time.   Recheck in 4-5 days in office.  Encourage fluid intake and PO intake as tolerated  Symptoms improved overall.           Return next Wednesday.    Subjective:      Patient ID: Jackie Suazo is a 69 y.o. female.    Chief Complaint   Patient presents with    Blood Pressure Check       Jackie is a 69 year old female who returns to the office for a recheck of low blood pressure. Reports that she is feeling improvement overall. Denies dizziness, states this symptom has resolved.         The following portions of the patient's history were reviewed and updated as appropriate: allergies, current medications, past family history, past medical history, past social history, past surgical history and problem list.    Review of Systems   Constitutional:  Positive for fatigue. Negative for fever.   Respiratory:  Negative for chest tightness and shortness of breath.    Cardiovascular:  Negative for chest pain.   Neurological:  Positive for dizziness and weakness.         Current Outpatient Medications   Medication Sig Dispense Refill    amLODIPine-benazepril (LOTREL) 10-40 MG per capsule TAKE 1 CAPSULE BY MOUTH DAILY 100 capsule 2    Calcium-Magnesium-Vitamin D (CALCIUM 1200+D3 PO) Take by mouth in the morning      ciprofloxacin (CIPRO) 500 mg tablet Take 500 mg by mouth every 12 (twelve) hours      ferric carboxymaltose (Injectafer) injection infuse 750 mg by intravenous route days 1 & 8; may repeat in 4-6 weeks      Ferrous Sulfate (IRON PO) Take by mouth 3 (three) times a week      Ferrous Sulfate ER (Slow Fe) 45 MG TBCR Take 45 mg by mouth daily Pt takes 1/2 pill in AM, 1/2 pill in PM      levothyroxine 50 mcg tablet TAKE 1 TABLET BY  "MOUTH DAILY (Patient taking differently: Take 50 mcg by mouth daily Patient holding meds per provider) 100 tablet 2    multivitamin (THERAGRAN) TABS Take 1 tablet by mouth daily      omeprazole (PriLOSEC) 40 MG capsule Take 40 mg by mouth daily       No current facility-administered medications for this visit.       Objective:    /60 (BP Location: Left arm, Patient Position: Sitting, Cuff Size: Standard)   Pulse 104   Temp 98 °F (36.7 °C) (Temporal)   Resp 18   Ht 5' 4\" (1.626 m)   Wt 45.8 kg (101 lb)   SpO2 99%   BMI 17.34 kg/m²        Physical Exam  Vitals reviewed.   Constitutional:       Appearance: Normal appearance.   HENT:      Head: Normocephalic and atraumatic.   Cardiovascular:      Rate and Rhythm: Normal rate and regular rhythm.      Pulses: Normal pulses.      Heart sounds: Normal heart sounds.   Pulmonary:      Effort: Pulmonary effort is normal.      Breath sounds: Normal breath sounds.   Neurological:      Mental Status: She is alert and oriented to person, place, and time.   Psychiatric:         Mood and Affect: Mood normal.                PRICE Vasquez  "

## 2024-07-19 NOTE — ASSESSMENT & PLAN NOTE
Continue to HOLD amlodipine at this time.   Recheck in 4-5 days in office.  Encourage fluid intake and PO intake as tolerated  Symptoms improved overall.

## 2024-07-19 NOTE — PROGRESS NOTES
Spoke with the  at [facility] - [phone].  She transferred me to the nurses.  I had to leave a message for the results.

## 2024-07-19 NOTE — ASSESSMENT & PLAN NOTE
BP improved in office today.   Continue to hold BP medications.  Recheck 4-5 days.  Pt reports she is feeling improved overall.

## 2024-07-22 ENCOUNTER — TELEPHONE (OUTPATIENT)
Age: 69
End: 2024-07-22

## 2024-07-22 NOTE — TELEPHONE ENCOUNTER
Pt was calling in regards to her urine results and wanted to see if it has been reviewed yet. I informed the pt that the results have not been reviewed yet but once the provider reviews those results we will give her a  call back. Pt is requesting a call back from Allie Barker, please advise with the pt thank you.

## 2024-07-24 ENCOUNTER — OFFICE VISIT (OUTPATIENT)
Dept: FAMILY MEDICINE CLINIC | Facility: CLINIC | Age: 69
End: 2024-07-24
Payer: COMMERCIAL

## 2024-07-24 VITALS
BODY MASS INDEX: 17.24 KG/M2 | RESPIRATION RATE: 16 BRPM | WEIGHT: 101 LBS | SYSTOLIC BLOOD PRESSURE: 170 MMHG | DIASTOLIC BLOOD PRESSURE: 68 MMHG | HEIGHT: 64 IN | TEMPERATURE: 97.9 F | OXYGEN SATURATION: 99 % | HEART RATE: 98 BPM

## 2024-07-24 DIAGNOSIS — I10 PRIMARY HYPERTENSION: ICD-10-CM

## 2024-07-24 DIAGNOSIS — D50.0 IRON DEFICIENCY ANEMIA DUE TO CHRONIC BLOOD LOSS: Primary | ICD-10-CM

## 2024-07-24 PROCEDURE — G2211 COMPLEX E/M VISIT ADD ON: HCPCS | Performed by: NURSE PRACTITIONER

## 2024-07-24 PROCEDURE — 99213 OFFICE O/P EST LOW 20 MIN: CPT | Performed by: NURSE PRACTITIONER

## 2024-07-24 RX ORDER — AMLODIPINE BESYLATE 10 MG/1
10 TABLET ORAL DAILY
Qty: 30 TABLET | Refills: 0 | Status: SHIPPED | OUTPATIENT
Start: 2024-07-24

## 2024-07-24 NOTE — PROGRESS NOTES
Assessment/Plan:    1. Iron deficiency anemia due to chronic blood loss  Assessment & Plan:  Pt received blood infusion yesterday and reports that she is feeling improved.  Continue hem/onc follow up as scheduled.  Recheck CBC in 2 days  2. Primary hypertension  Assessment & Plan:  Recommend pt restart BP medication. Advise she start amlodipine only at this time.  Monitor pressures at home and record findings. Call with any issues.   Orders:  -     amLODIPine (NORVASC) 10 mg tablet; Take 1 tablet (10 mg total) by mouth daily          Return if symptoms worsen or fail to improve.    Subjective:      Patient ID: Jackie Suazo is a 69 y.o. female.    Chief Complaint   Patient presents with    Follow-up    Blood Pressure Check     5 day blood pressure check, Kalen/ALISSA       Jackie is a 69 year old female who returns to the office for a recheck of fatigue and weakness. Pt reports that she had a blood transfusion yesterday. Reports that she feels improved after infusion. Pt is going to be scheduled for IV infustions through hematology/oncology.         The following portions of the patient's history were reviewed and updated as appropriate: allergies, current medications, past family history, past medical history, past social history, past surgical history and problem list.    Review of Systems   Constitutional:  Positive for fatigue. Negative for chills and fever.   Respiratory:  Negative for shortness of breath.    Cardiovascular:  Negative for chest pain.         Current Outpatient Medications   Medication Sig Dispense Refill    amLODIPine (NORVASC) 10 mg tablet Take 1 tablet (10 mg total) by mouth daily 30 tablet 0    Calcium-Magnesium-Vitamin D (CALCIUM 1200+D3 PO) Take by mouth in the morning      ferric carboxymaltose (Injectafer) injection infuse 750 mg by intravenous route days 1 & 8; may repeat in 4-6 weeks      Ferrous Sulfate (IRON PO) Take by mouth daily Half tablet in AM, Half tablet in PM      Ferrous  "Sulfate ER (Slow Fe) 45 MG TBCR Take 45 mg by mouth daily Pt takes 1/2 pill in AM, 1/2 pill in PM      levothyroxine 50 mcg tablet TAKE 1 TABLET BY MOUTH DAILY (Patient taking differently: Take 50 mcg by mouth daily Patient holding meds per provider) 100 tablet 2    multivitamin (THERAGRAN) TABS Take 1 tablet by mouth daily      omeprazole (PriLOSEC) 40 MG capsule Take 40 mg by mouth daily      ciprofloxacin (CIPRO) 500 mg tablet Take 500 mg by mouth every 12 (twelve) hours (Patient not taking: Reported on 7/24/2024)       No current facility-administered medications for this visit.       Objective:    /68   Pulse 98   Temp 97.9 °F (36.6 °C) (Temporal)   Resp 16   Ht 5' 4\" (1.626 m)   Wt 45.8 kg (101 lb)   SpO2 99%   BMI 17.34 kg/m²        Physical Exam  Vitals reviewed.   Constitutional:       Appearance: Normal appearance.   HENT:      Head: Normocephalic and atraumatic.   Neurological:      Mental Status: She is alert and oriented to person, place, and time.   Psychiatric:         Mood and Affect: Mood normal.                PRICE Vasquez  "

## 2024-07-24 NOTE — ASSESSMENT & PLAN NOTE
Pt received blood infusion yesterday and reports that she is feeling improved.  Continue hem/onc follow up as scheduled.  Recheck CBC in 2 days

## 2024-07-24 NOTE — ASSESSMENT & PLAN NOTE
Recommend pt restart BP medication. Advise she start amlodipine only at this time.  Monitor pressures at home and record findings. Call with any issues.

## 2024-07-31 ENCOUNTER — OFFICE VISIT (OUTPATIENT)
Dept: FAMILY MEDICINE CLINIC | Facility: CLINIC | Age: 69
End: 2024-07-31
Payer: COMMERCIAL

## 2024-07-31 VITALS
WEIGHT: 101 LBS | RESPIRATION RATE: 20 BRPM | HEIGHT: 64 IN | BODY MASS INDEX: 17.24 KG/M2 | DIASTOLIC BLOOD PRESSURE: 60 MMHG | OXYGEN SATURATION: 95 % | HEART RATE: 112 BPM | SYSTOLIC BLOOD PRESSURE: 136 MMHG | TEMPERATURE: 97.6 F

## 2024-07-31 DIAGNOSIS — R53.83 OTHER FATIGUE: Primary | ICD-10-CM

## 2024-07-31 DIAGNOSIS — D50.9 IRON DEFICIENCY ANEMIA, UNSPECIFIED IRON DEFICIENCY ANEMIA TYPE: ICD-10-CM

## 2024-07-31 LAB
SL AMB  POCT GLUCOSE, UA: NORMAL
SL AMB LEUKOCYTE ESTERASE,UA: 25
SL AMB POCT BILIRUBIN,UA: 1
SL AMB POCT BLOOD,UA: 250
SL AMB POCT CLARITY,UA: ABNORMAL
SL AMB POCT COLOR,UA: YELLOW
SL AMB POCT KETONES,UA: NEGATIVE
SL AMB POCT NITRITE,UA: NEGATIVE
SL AMB POCT PH,UA: 5
SL AMB POCT SPECIFIC GRAVITY,UA: 1.01
SL AMB POCT URINE PROTEIN: 30
SL AMB POCT UROBILINOGEN: NORMAL

## 2024-07-31 PROCEDURE — 99214 OFFICE O/P EST MOD 30 MIN: CPT | Performed by: FAMILY MEDICINE

## 2024-07-31 PROCEDURE — G2211 COMPLEX E/M VISIT ADD ON: HCPCS | Performed by: FAMILY MEDICINE

## 2024-07-31 PROCEDURE — 81003 URINALYSIS AUTO W/O SCOPE: CPT | Performed by: FAMILY MEDICINE

## 2024-07-31 NOTE — LETTER
MARY KAY      Current Outpatient Medications:     amLODIPine (NORVASC) 10 mg tablet, Take 1 tablet (10 mg total) by mouth daily, Disp: 30 tablet, Rfl: 0    Calcium-Magnesium-Vitamin D (CALCIUM 1200+D3 PO), Take by mouth in the morning, Disp: , Rfl:     ferric carboxymaltose (Injectafer) injection, infuse 750 mg by intravenous route days 1 & 8; may repeat in 4-6 weeks, Disp: , Rfl:     Ferrous Sulfate (IRON PO), Take by mouth daily Half tablet in AM, Half tablet in PM, Disp: , Rfl:     Ferrous Sulfate ER (Slow Fe) 45 MG TBCR, Take 45 mg by mouth daily Pt takes 1/2 pill in AM, 1/2 pill in PM, Disp: , Rfl:     levothyroxine 50 mcg tablet, TAKE 1 TABLET BY MOUTH DAILY (Patient taking differently: Take 50 mcg by mouth daily), Disp: 100 tablet, Rfl: 2    multivitamin (THERAGRAN) TABS, Take 1 tablet by mouth daily, Disp: , Rfl:     omeprazole (PriLOSEC) 40 MG capsule, Take 40 mg by mouth daily, Disp: , Rfl:     ciprofloxacin (CIPRO) 500 mg tablet, Take 500 mg by mouth every 12 (twelve) hours (Patient not taking: Reported on 7/24/2024), Disp: , Rfl:       Recent Results (from the past 672 hour(s))   POCT urine dip auto non-scope    Collection Time: 07/31/24  4:00 PM   Result Value Ref Range     COLOR,UA yellow     CLARITY,UA dark     SPECIFIC GRAVITY,UA 1.015      PH,UA 5     LEUKOCYTE ESTERASE,UA 25     NITRITE,UA negative     GLUCOSE, UA normal     KETONES,UA negative     BILIRUBIN,UA 1     BLOOD,     POCT URINE PROTEIN 30     SL AMB POCT UROBILINOGEN normal    Urine culture    Collection Time: 07/31/24  4:02 PM    Specimen: Urine, Clean Catch    Urine  Release to seth   Result Value Ref Range    Urine Culture Result Final report    Result    Collection Time: 07/31/24  4:02 PM   Result Value Ref Range    Result 1 Comment    CBC and differential    Collection Time: 08/01/24  1:04 PM   Result Value Ref Range    White Blood Cell Count 10.9 (H) 3.4 - 10.8 x10E3/uL    Red Blood Cell Count 2.58 (LL) 3.77 -  5.28 x10E6/uL    Hemoglobin 7.5 (L) 11.1 - 15.9 g/dL    HCT 24.2 (L) 34.0 - 46.6 %    MCV 94 79 - 97 fL    MCH 29.1 26.6 - 33.0 pg    MCHC 31.0 (L) 31.5 - 35.7 g/dL    RDW 16.4 (H) 11.7 - 15.4 %    Platelet Count 312 150 - 450 x10E3/uL    Neutrophils 58 Not Estab. %    Lymphocytes 29 Not Estab. %    Monocytes 8 Not Estab. %    Eosinophils 0 Not Estab. %    Basophils PCT 1 Not Estab. %    Neutrophils (Absolute) 6.4 1.4 - 7.0 x10E3/uL    Lymphocytes (Absolute) 3.2 (H) 0.7 - 3.1 x10E3/uL    Monocytes (Absolute) 0.9 0.1 - 0.9 x10E3/uL    Eosinophils (Absolute) 0.0 0.0 - 0.4 x10E3/uL    Basophils ABS 0.1 0.0 - 0.2 x10E3/uL    Immature Granulocytes 4 Not Estab. %    Immature Granulocytes (Absolute) 0.4 (H) 0.0 - 0.1 x10E3/uL    Nucleated Red Blood Cells 19 (H) 0 - 0 %    Hematology Comments Note:    Comprehensive metabolic panel    Collection Time: 08/01/24  1:04 PM   Result Value Ref Range    Glucose, Random 94 70 - 99 mg/dL    BUN 26 8 - 27 mg/dL    Creatinine 0.93 0.57 - 1.00 mg/dL    eGFR 67 >59 mL/min/1.73    SL AMB BUN/CREATININE RATIO 28 12 - 28    Sodium 137 134 - 144 mmol/L    Potassium 4.6 3.5 - 5.2 mmol/L    Chloride 101 96 - 106 mmol/L    CO2 22 20 - 29 mmol/L    CALCIUM 9.0 8.7 - 10.3 mg/dL    Protein, Total 6.5 6.0 - 8.5 g/dL    Albumin 3.5 (L) 3.9 - 4.9 g/dL    Globulin, Total 3.0 1.5 - 4.5 g/dL    TOTAL BILIRUBIN 0.8 0.0 - 1.2 mg/dL    Alk Phos Isoenzymes 90 44 - 121 IU/L    AST 49 (H) 0 - 40 IU/L    ALT 18 0 - 32 IU/L   TSH, 3rd generation    Collection Time: 08/01/24  1:04 PM   Result Value Ref Range    TSH 5.860 (H) 0.450 - 4.500 uIU/mL   Sedimentation rate, automated    Collection Time: 08/01/24  1:04 PM   Result Value Ref Range    Sedimentation Rate 20 0 - 40 mm/hr   C-reactive protein    Collection Time: 08/01/24  1:04 PM   Result Value Ref Range    C-Reactive Protein, Quant 24 (H) 0 - 10 mg/L   Iron    Collection Time: 08/01/24  1:04 PM   Result Value Ref Range    Iron, Serum 70 27 - 139 ug/dL

## 2024-08-01 LAB
BACTERIA UR CULT: NORMAL
ERYTHROCYTE [SEDIMENTATION RATE] IN BLOOD BY WESTERGREN METHOD: 20 MM/HR (ref 0–40)
Lab: NORMAL

## 2024-08-02 LAB
ALBUMIN SERPL-MCNC: 3.5 G/DL (ref 3.9–4.9)
ALP SERPL-CCNC: 90 IU/L (ref 44–121)
ALT SERPL-CCNC: 18 IU/L (ref 0–32)
AST SERPL-CCNC: 49 IU/L (ref 0–40)
B BURGDOR IGG+IGM SER QL IA: NEGATIVE
BASOPHILS # BLD AUTO: 0.1 X10E3/UL (ref 0–0.2)
BASOPHILS NFR BLD AUTO: 1 %
BILIRUB SERPL-MCNC: 0.8 MG/DL (ref 0–1.2)
BUN SERPL-MCNC: 26 MG/DL (ref 8–27)
BUN/CREAT SERPL: 28 (ref 12–28)
CALCIUM SERPL-MCNC: 9 MG/DL (ref 8.7–10.3)
CHLORIDE SERPL-SCNC: 101 MMOL/L (ref 96–106)
CO2 SERPL-SCNC: 22 MMOL/L (ref 20–29)
CREAT SERPL-MCNC: 0.93 MG/DL (ref 0.57–1)
CRP SERPL-MCNC: 24 MG/L (ref 0–10)
EGFR: 67 ML/MIN/1.73
EOSINOPHIL # BLD AUTO: 0 X10E3/UL (ref 0–0.4)
EOSINOPHIL NFR BLD AUTO: 0 %
ERYTHROCYTE [DISTWIDTH] IN BLOOD BY AUTOMATED COUNT: 16.4 % (ref 11.7–15.4)
GLOBULIN SER-MCNC: 3 G/DL (ref 1.5–4.5)
GLUCOSE SERPL-MCNC: 94 MG/DL (ref 70–99)
HCT VFR BLD AUTO: 24.2 % (ref 34–46.6)
HGB BLD-MCNC: 7.5 G/DL (ref 11.1–15.9)
IMM GRANULOCYTES # BLD: 0.4 X10E3/UL (ref 0–0.1)
IMM GRANULOCYTES NFR BLD: 4 %
IRON SERPL-MCNC: 70 UG/DL (ref 27–139)
LYMPHOCYTES # BLD AUTO: 3.2 X10E3/UL (ref 0.7–3.1)
LYMPHOCYTES NFR BLD AUTO: 29 %
MCH RBC QN AUTO: 29.1 PG (ref 26.6–33)
MCHC RBC AUTO-ENTMCNC: 31 G/DL (ref 31.5–35.7)
MCV RBC AUTO: 94 FL (ref 79–97)
MONOCYTES # BLD AUTO: 0.9 X10E3/UL (ref 0.1–0.9)
MONOCYTES NFR BLD AUTO: 8 %
MORPHOLOGY BLD-IMP: ABNORMAL
NEUTROPHILS # BLD AUTO: 6.4 X10E3/UL (ref 1.4–7)
NEUTROPHILS NFR BLD AUTO: 58 %
NRBC BLD AUTO-RTO: 19 % (ref 0–0)
PLATELET # BLD AUTO: 312 X10E3/UL (ref 150–450)
POTASSIUM SERPL-SCNC: 4.6 MMOL/L (ref 3.5–5.2)
PROT SERPL-MCNC: 6.5 G/DL (ref 6–8.5)
RBC # BLD AUTO: 2.58 X10E6/UL (ref 3.77–5.28)
SODIUM SERPL-SCNC: 137 MMOL/L (ref 134–144)
TSH SERPL DL<=0.005 MIU/L-ACNC: 5.86 UIU/ML (ref 0.45–4.5)
WBC # BLD AUTO: 10.9 X10E3/UL (ref 3.4–10.8)

## 2024-08-05 ENCOUNTER — TELEPHONE (OUTPATIENT)
Dept: FAMILY MEDICINE CLINIC | Facility: CLINIC | Age: 69
End: 2024-08-05

## 2024-08-05 NOTE — TELEPHONE ENCOUNTER
----- Message from PRICE Estes sent at 8/5/2024  3:34 PM EDT -----  Has pt been seen with hematology for infusion? Her counts are low.     Allie  ----- Message -----  From: Kaylie Bustillo  Sent: 7/31/2024   4:01 PM EDT  To: Josue Monk MD

## 2024-08-07 DIAGNOSIS — E03.9 ACQUIRED HYPOTHYROIDISM: ICD-10-CM

## 2024-08-07 DIAGNOSIS — R53.83 OTHER FATIGUE: ICD-10-CM

## 2024-08-07 DIAGNOSIS — D50.9 IRON DEFICIENCY ANEMIA, UNSPECIFIED IRON DEFICIENCY ANEMIA TYPE: ICD-10-CM

## 2024-08-07 DIAGNOSIS — E78.2 MIXED HYPERLIPIDEMIA: ICD-10-CM

## 2024-08-07 DIAGNOSIS — E44.1 MILD PROTEIN-CALORIE MALNUTRITION (HCC): ICD-10-CM

## 2024-08-07 DIAGNOSIS — Z00.00 MEDICARE ANNUAL WELLNESS VISIT, SUBSEQUENT: Primary | ICD-10-CM

## 2024-08-07 DIAGNOSIS — K21.9 GERD WITHOUT ESOPHAGITIS: ICD-10-CM

## 2024-08-09 ENCOUNTER — TELEPHONE (OUTPATIENT)
Dept: FAMILY MEDICINE CLINIC | Facility: CLINIC | Age: 69
End: 2024-08-09

## 2024-08-09 RX ORDER — ATOVAQUONE 750 MG/5ML
SUSPENSION ORAL
COMMUNITY
Start: 2024-08-09

## 2024-08-09 RX ORDER — AZITHROMYCIN 500 MG/1
TABLET, FILM COATED ORAL
COMMUNITY
Start: 2024-08-09

## 2024-08-09 NOTE — TELEPHONE ENCOUNTER
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  He wanted Allie and Dr Monk to know.

## 2024-08-09 NOTE — PATIENT INSTRUCTIONS
PLENTY OF FLUIDS - HYDRATION  REST  BW TODAY  SYMPTOMS DIARY    MONITOR    FURTHER PLANS PENDING EVAL

## 2024-08-09 NOTE — PROGRESS NOTES
Ambulatory Visit  Name: Jackie Suazo      : 1955      MRN: [id number]  Encounter Provider: Josue Monk MD  Encounter Date: 2024   Encounter department: [facility]    Assessment & Plan   1. Other fatigue  -     CBC and differential; Future  -     Comprehensive metabolic panel; Future  -     TSH, 3rd generation; Future  -     Lyme Total AB W Reflex to IGM/IGG; Future  -     Sedimentation rate, automated; Future  -     C-reactive protein; Future  -     Iron; Future  -     CBC and differential  -     Comprehensive metabolic panel  -     TSH, 3rd generation  -     Lyme Total AB W Reflex to IGM/IGG  -     Sedimentation rate, automated  -     C-reactive protein  -     Iron  -     POCT urine dip auto non-scope  -     Urine culture  2. Iron deficiency anemia, unspecified iron deficiency anemia type  -     Iron; Future  -     Iron         History of Present Illness     PATIENT CONTINUES TO FEEL EXTREMELY FATIGUED  TIRED  S/P RECENT TRANSFUSION  SLEEPING OK    DENIES ANY FEVER OR CHILLS  NOTES NO CP, PALPITATIONS  SL SOB  NO NVD    DOES NOTE A LOW GRADE FEVER  NO RASHES        Review of Systems   Constitutional:  Positive for activity change, fatigue and fever. Negative for chills.   HENT:  Negative for congestion, ear discharge, ear pain, mouth sores, postnasal drip, sore throat and trouble swallowing.    Eyes:  Negative for pain, discharge and visual disturbance.   Respiratory:  Positive for shortness of breath. Negative for cough and wheezing.    Cardiovascular:  Negative for chest pain, palpitations and leg swelling.   Gastrointestinal:  Negative for abdominal distention, abdominal pain, blood in stool, diarrhea and nausea.   Endocrine: Negative for polydipsia, polyphagia and polyuria.   Genitourinary:  Negative for dysuria, frequency, hematuria and urgency.   Musculoskeletal:  Negative for arthralgias, gait problem and joint swelling.   Skin:  Negative for pallor and  rash.   Neurological:  Negative for dizziness, syncope, speech difficulty, weakness, light-headedness, numbness and headaches.   Hematological:  Negative for adenopathy.   Psychiatric/Behavioral:  Negative for behavioral problems, confusion and sleep disturbance. The patient is not nervous/anxious.      History reviewed. No pertinent past medical history.  Past Surgical History:   Procedure Laterality Date   • ABDOMINAL SURGERY       History reviewed. No pertinent family history.  Social History     Tobacco Use   • Smoking status: Never     Passive exposure: Never   • Smokeless tobacco: Never   Vaping Use   • Vaping status: Never Used   Substance and Sexual Activity   • Alcohol use: Yes     Comment: SOC   • Drug use: Never   • Sexual activity: Not on file     Current Outpatient Medications on File Prior to Visit   Medication Sig   • amLODIPine (NORVASC) 10 mg tablet Take 1 tablet (10 mg total) by mouth daily   • atovaquone (MEPRON) 750 mg/5 mL suspension    • azithromycin (ZITHROMAX) 500 MG tablet    • Calcium-Magnesium-Vitamin D (CALCIUM 1200+D3 PO) Take by mouth in the morning   • ferric carboxymaltose (Injectafer) injection infuse 750 mg by intravenous route days 1 & 8; may repeat in 4-6 weeks   • Ferrous Sulfate (IRON PO) Take by mouth daily Half tablet in AM, Half tablet in PM   • Ferrous Sulfate ER (Slow Fe) 45 MG TBCR Take 45 mg by mouth daily Pt takes 1/2 pill in AM, 1/2 pill in PM   • levothyroxine 50 mcg tablet TAKE 1 TABLET BY MOUTH DAILY (Patient taking differently: Take 50 mcg by mouth daily)   • multivitamin (THERAGRAN) TABS Take 1 tablet by mouth daily   • omeprazole (PriLOSEC) 40 MG capsule Take 40 mg by mouth daily     Allergies   Allergen Reactions   • Clavulanic Acid Hives   • Amoxicillin Hives and Rash   • Pollen Extract Nasal Congestion     Immunization History   Administered Date(s) Administered   • COVID-19 MODERNA VACC 0.5 ML IM 03/03/2021, 03/31/2021   • INFLUENZA 10/06/2017   • Influenza,  "injectable, quadrivalent, preservative free 0.5 mL 10/24/2018, 10/31/2019   • Influenza, seasonal, injectable 1955, 10/12/2014, 10/23/2015, 10/22/2016, 10/12/2021   • Tdap 07/23/2015   • Zoster Vaccine Recombinant 04/28/2018, 07/17/2018     Objective     /60 (BP Location: Left arm, Patient Position: Sitting, Cuff Size: Standard)   Pulse (!) 112   Temp 97.6 °F (36.4 °C) (Temporal)   Resp 20   Ht 5' 4\" (1.626 m)   Wt 45.8 kg (101 lb)   SpO2 95%   BMI 17.34 kg/m²     Physical Exam    "

## 2024-09-06 LAB
ALBUMIN SERPL-MCNC: 4.2 G/DL (ref 3.9–4.9)
ALP SERPL-CCNC: 101 IU/L (ref 44–121)
ALT SERPL-CCNC: 18 IU/L (ref 0–32)
AST SERPL-CCNC: 33 IU/L (ref 0–40)
BASOPHILS # BLD AUTO: 0.1 X10E3/UL (ref 0–0.2)
BASOPHILS NFR BLD AUTO: 1 %
BILIRUB SERPL-MCNC: 0.3 MG/DL (ref 0–1.2)
BUN SERPL-MCNC: 29 MG/DL (ref 8–27)
BUN/CREAT SERPL: 33 (ref 12–28)
CALCIUM SERPL-MCNC: 9.9 MG/DL (ref 8.7–10.3)
CHLORIDE SERPL-SCNC: 100 MMOL/L (ref 96–106)
CHOLEST SERPL-MCNC: 204 MG/DL (ref 100–199)
CHOLEST/HDLC SERPL: 3.4 RATIO (ref 0–4.4)
CO2 SERPL-SCNC: 22 MMOL/L (ref 20–29)
CREAT SERPL-MCNC: 0.89 MG/DL (ref 0.57–1)
EGFR: 71 ML/MIN/1.73
EOSINOPHIL # BLD AUTO: 0 X10E3/UL (ref 0–0.4)
EOSINOPHIL NFR BLD AUTO: 0 %
ERYTHROCYTE [DISTWIDTH] IN BLOOD BY AUTOMATED COUNT: 13.6 % (ref 11.7–15.4)
GLOBULIN SER-MCNC: 3.1 G/DL (ref 1.5–4.5)
GLUCOSE SERPL-MCNC: 88 MG/DL (ref 70–99)
HCT VFR BLD AUTO: 29.2 % (ref 34–46.6)
HDLC SERPL-MCNC: 60 MG/DL
HGB BLD-MCNC: 9.4 G/DL (ref 11.1–15.9)
IMM GRANULOCYTES # BLD: 0 X10E3/UL (ref 0–0.1)
IMM GRANULOCYTES NFR BLD: 0 %
LDLC SERPL CALC-MCNC: 124 MG/DL (ref 0–99)
LYMPHOCYTES # BLD AUTO: 1.2 X10E3/UL (ref 0.7–3.1)
LYMPHOCYTES NFR BLD AUTO: 20 %
MCH RBC QN AUTO: 30.4 PG (ref 26.6–33)
MCHC RBC AUTO-ENTMCNC: 32.2 G/DL (ref 31.5–35.7)
MCV RBC AUTO: 95 FL (ref 79–97)
MONOCYTES # BLD AUTO: 0.5 X10E3/UL (ref 0.1–0.9)
MONOCYTES NFR BLD AUTO: 8 %
NEUTROPHILS # BLD AUTO: 4.4 X10E3/UL (ref 1.4–7)
NEUTROPHILS NFR BLD AUTO: 71 %
PLATELET # BLD AUTO: 374 X10E3/UL (ref 150–450)
POTASSIUM SERPL-SCNC: 5.1 MMOL/L (ref 3.5–5.2)
PROT SERPL-MCNC: 7.3 G/DL (ref 6–8.5)
RBC # BLD AUTO: 3.09 X10E6/UL (ref 3.77–5.28)
SL AMB VLDL CHOLESTEROL CALC: 20 MG/DL (ref 5–40)
SODIUM SERPL-SCNC: 135 MMOL/L (ref 134–144)
TRIGL SERPL-MCNC: 114 MG/DL (ref 0–149)
TSH SERPL DL<=0.005 MIU/L-ACNC: 2.59 UIU/ML (ref 0.45–4.5)
WBC # BLD AUTO: 6.2 X10E3/UL (ref 3.4–10.8)

## 2024-09-09 ENCOUNTER — RA CDI HCC (OUTPATIENT)
Dept: OTHER | Facility: HOSPITAL | Age: 69
End: 2024-09-09

## 2024-09-12 PROBLEM — E86.1 HYPOTENSION DUE TO HYPOVOLEMIA: Status: RESOLVED | Noted: 2024-07-19 | Resolved: 2024-09-12

## 2024-09-12 NOTE — ASSESSMENT & PLAN NOTE
WATCHING CHOLESTEROL INTAKE  COMPLIANT WITH MEDICATION  NO CONCERNS    - CONTINUE TO MONITOR DIETARY CHOL INTAKE  - CONTINUE CURRENT MEDICATION  - ENCOURAGED PHYSICAL ACTIVITY

## 2024-09-12 NOTE — ASSESSMENT & PLAN NOTE
STABLE  DENIES ANY CP, INDIGESTION, OR COUGH  NOTES NO MELENA OR HEMATOCHEZIA  NO HEMATEMESIS  COMPLIANT WITH MEDICATION  NO CONCERNS    - CONTINUE CURRENT TREATMENT PLAN  - MEDICATION AS PRESCRIBED  - AVOID CAFFEINE, ALCOHOL OR SMOKING

## 2024-09-12 NOTE — ASSESSMENT & PLAN NOTE
STABLE  DENIES ANY CP, SOB, PALPITATIONS, OR HEADACHE  NOTES NO WATER RETENTION  COMPLIANT WITH MEDICATION  NO CONCERNS    - CONTINUE CURRENT TREATMENT PLAN  - MONITOR DIETARY SODIUM INTAKE  - ENCOURAGE PHYSICAL ACTIVITY  - RV 3 MONTHS      Orders:    POCT ECG

## 2024-09-12 NOTE — PATIENT INSTRUCTIONS
DISCUSSED HEALTH ISSUES  HEALTHY DIET AND EXERCISE  BW WILL BE REVIEWED  MAMMOGRAPHY   RECOMMEND CALCIUM 5102-8896 MG DAILY  VITAMIN D3  1000 IU DAILY  RV IN 1 YEAR FOR ANNUAL EXAM, SOONER IF NEEDED      RV 6M    Recent Results (from the past 672 hour(s))   CBC and differential    Collection Time: 09/06/24 10:46 AM   Result Value Ref Range    White Blood Cell Count 6.2 3.4 - 10.8 x10E3/uL    Red Blood Cell Count 3.09 (L) 3.77 - 5.28 x10E6/uL    Hemoglobin 9.4 (L) 11.1 - 15.9 g/dL    HCT 29.2 (L) 34.0 - 46.6 %    MCV 95 79 - 97 fL    MCH 30.4 26.6 - 33.0 pg    MCHC 32.2 31.5 - 35.7 g/dL    RDW 13.6 11.7 - 15.4 %    Platelet Count 374 150 - 450 x10E3/uL    Neutrophils 71 Not Estab. %    Lymphocytes 20 Not Estab. %    Monocytes 8 Not Estab. %    Eosinophils 0 Not Estab. %    Basophils PCT 1 Not Estab. %    Neutrophils (Absolute) 4.4 1.4 - 7.0 x10E3/uL    Lymphocytes (Absolute) 1.2 0.7 - 3.1 x10E3/uL    Monocytes (Absolute) 0.5 0.1 - 0.9 x10E3/uL    Eosinophils (Absolute) 0.0 0.0 - 0.4 x10E3/uL    Basophils ABS 0.1 0.0 - 0.2 x10E3/uL    Immature Granulocytes 0 Not Estab. %    Immature Granulocytes (Absolute) 0.0 0.0 - 0.1 x10E3/uL   Comprehensive metabolic panel    Collection Time: 09/06/24 10:46 AM   Result Value Ref Range    Glucose, Random 88 70 - 99 mg/dL    BUN 29 (H) 8 - 27 mg/dL    Creatinine 0.89 0.57 - 1.00 mg/dL    eGFR 71 >59 mL/min/1.73    SL AMB BUN/CREATININE RATIO 33 (H) 12 - 28    Sodium 135 134 - 144 mmol/L    Potassium 5.1 3.5 - 5.2 mmol/L    Chloride 100 96 - 106 mmol/L    CO2 22 20 - 29 mmol/L    CALCIUM 9.9 8.7 - 10.3 mg/dL    Protein, Total 7.3 6.0 - 8.5 g/dL    Albumin 4.2 3.9 - 4.9 g/dL    Globulin, Total 3.1 1.5 - 4.5 g/dL    TOTAL BILIRUBIN 0.3 0.0 - 1.2 mg/dL    Alk Phos Isoenzymes 101 44 - 121 IU/L    AST 33 0 - 40 IU/L    ALT 18 0 - 32 IU/L   Lipid panel    Collection Time: 09/06/24 10:46 AM   Result Value Ref Range    Cholesterol, Total 204 (H) 100 - 199 mg/dL    Triglycerides 114 0 - 149  mg/dL    HDL 60 >39 mg/dL    VLDL Cholesterol Calculated 20 5 - 40 mg/dL    LDL Calculated 124 (H) 0 - 99 mg/dL    T. Chol/HDL Ratio 3.4 0.0 - 4.4 ratio   TSH, 3rd generation    Collection Time: 09/06/24 10:46 AM   Result Value Ref Range    TSH 2.590 0.450 - 4.500 uIU/mL       Medicare Preventive Visit Patient Instructions  Thank you for completing your Welcome to Medicare Visit or Medicare Annual Wellness Visit today. Your next wellness visit will be due in one year (9/14/2025).  The screening/preventive services that you may require over the next 5-10 years are detailed below. Some tests may not apply to you based off risk factors and/or age. Screening tests ordered at today's visit but not completed yet may show as past due. Also, please note that scanned in results may not display below.  Preventive Screenings:  Service Recommendations Previous Testing/Comments   Colorectal Cancer Screening  * Colonoscopy    * Fecal Occult Blood Test (FOBT)/Fecal Immunochemical Test (FIT)  * Fecal DNA/Cologuard Test  * Flexible Sigmoidoscopy Age: 45-75 years old   Colonoscopy: every 10 years (may be performed more frequently if at higher risk)  OR  FOBT/FIT: every 1 year  OR  Cologuard: every 3 years  OR  Sigmoidoscopy: every 5 years  Screening may be recommended earlier than age 45 if at higher risk for colorectal cancer. Also, an individualized decision between you and your healthcare provider will decide whether screening between the ages of 76-85 would be appropriate. Colonoscopy: 04/14/2023  FOBT/FIT: Not on file  Cologuard: Not on file  Sigmoidoscopy: Not on file    Screening Current     Breast Cancer Screening Age: 40+ years old  Frequency: every 1-2 years  Not required if history of left and right mastectomy Mammogram: 11/15/2023    Screening Current   Cervical Cancer Screening Between the ages of 21-29, pap smear recommended once every 3 years.   Between the ages of 30-65, can perform pap smear with HPV co-testing every  5 years.   Recommendations may differ for women with a history of total hysterectomy, cervical cancer, or abnormal pap smears in past. Pap Smear: Not on file    Screening Not Indicated   Hepatitis C Screening Once for adults born between 1945 and 1965  More frequently in patients at high risk for Hepatitis C Hep C Antibody: 05/26/2021    Screening Current   Diabetes Screening 1-2 times per year if you're at risk for diabetes or have pre-diabetes Fasting glucose: No results in last 5 years (No results in last 5 years)  A1C: No results in last 5 years (No results in last 5 years)  Screening Current   Cholesterol Screening Once every 5 years if you don't have a lipid disorder. May order more often based on risk factors. Lipid panel: 09/06/2024    Screening Not Indicated  History Lipid Disorder     Other Preventive Screenings Covered by Medicare:  Abdominal Aortic Aneurysm (AAA) Screening: covered once if your at risk. You're considered to be at risk if you have a family history of AAA.  Lung Cancer Screening: covers low dose CT scan once per year if you meet all of the following conditions: (1) Age 55-77; (2) No signs or symptoms of lung cancer; (3) Current smoker or have quit smoking within the last 15 years; (4) You have a tobacco smoking history of at least 20 pack years (packs per day multiplied by number of years you smoked); (5) You get a written order from a healthcare provider.  Glaucoma Screening: covered annually if you're considered high risk: (1) You have diabetes OR (2) Family history of glaucoma OR (3)  aged 50 and older OR (4)  American aged 65 and older  Osteoporosis Screening: covered every 2 years if you meet one of the following conditions: (1) You're estrogen deficient and at risk for osteoporosis based off medical history and other findings; (2) Have a vertebral abnormality; (3) On glucocorticoid therapy for more than 3 months; (4) Have primary hyperparathyroidism; (5) On  osteoporosis medications and need to assess response to drug therapy.   Last bone density test (DXA Scan): 11/15/2023.  HIV Screening: covered annually if you're between the age of 15-65. Also covered annually if you are younger than 15 and older than 65 with risk factors for HIV infection. For pregnant patients, it is covered up to 3 times per pregnancy.    Immunizations:  Immunization Recommendations   Influenza Vaccine Annual influenza vaccination during flu season is recommended for all persons aged >= 6 months who do not have contraindications   Pneumococcal Vaccine   * Pneumococcal conjugate vaccine = PCV13 (Prevnar 13), PCV15 (Vaxneuvance), PCV20 (Prevnar 20)  * Pneumococcal polysaccharide vaccine = PPSV23 (Pneumovax) Adults 19-63 yo with certain risk factors or if 65+ yo  If never received any pneumonia vaccine: recommend Prevnar 20 (PCV20)  Give PCV20 if previously received 1 dose of PCV13 or PPSV23   Hepatitis B Vaccine 3 dose series if at intermediate or high risk (ex: diabetes, end stage renal disease, liver disease)   Respiratory syncytial virus (RSV) Vaccine - COVERED BY MEDICARE PART D  * RSVPreF3 (Arexvy) CDC recommends that adults 60 years of age and older may receive a single dose of RSV vaccine using shared clinical decision-making (SCDM)   Tetanus (Td) Vaccine - COST NOT COVERED BY MEDICARE PART B Following completion of primary series, a booster dose should be given every 10 years to maintain immunity against tetanus. Td may also be given as tetanus wound prophylaxis.   Tdap Vaccine - COST NOT COVERED BY MEDICARE PART B Recommended at least once for all adults. For pregnant patients, recommended with each pregnancy.   Shingles Vaccine (Shingrix) - COST NOT COVERED BY MEDICARE PART B  2 shot series recommended in those 19 years and older who have or will have weakened immune systems or those 50 years and older     Health Maintenance Due:      Topic Date Due   • Breast Cancer Screening: Mammogram   11/15/2024   • Colorectal Cancer Screening  04/14/2026   • DXA SCAN  11/15/2028   • Hepatitis C Screening  Completed     Immunizations Due:      Topic Date Due   • Pneumococcal Vaccine: 65+ Years (1 of 1 - PCV) Never done   • COVID-19 Vaccine (3 - 2023-24 season) 09/01/2024   • Influenza Vaccine (1) 09/01/2024     Advance Directives   What are advance directives?  Advance directives are legal documents that state your wishes and plans for medical care. These plans are made ahead of time in case you lose your ability to make decisions for yourself. Advance directives can apply to any medical decision, such as the treatments you want, and if you want to donate organs.   What are the types of advance directives?  There are many types of advance directives, and each state has rules about how to use them. You may choose a combination of any of the following:  Living will:  This is a written record of the treatment you want. You can also choose which treatments you do not want, which to limit, and which to stop at a certain time. This includes surgery, medicine, IV fluid, and tube feedings.   Durable power of  for healthcare (DPAHC):  This is a written record that states who you want to make healthcare choices for you when you are unable to make them for yourself. This person, called a proxy, is usually a family member or a friend. You may choose more than 1 proxy.  Do not resuscitate (DNR) order:  A DNR order is used in case your heart stops beating or you stop breathing. It is a request not to have certain forms of treatment, such as CPR. A DNR order may be included in other types of advance directives.  Medical directive:  This covers the care that you want if you are in a coma, near death, or unable to make decisions for yourself. You can list the treatments you want for each condition. Treatment may include pain medicine, surgery, blood transfusions, dialysis, IV or tube feedings, and a ventilator (breathing  machine).  Values history:  This document has questions about your views, beliefs, and how you feel and think about life. This information can help others choose the care that you would choose.  Why are advance directives important?  An advance directive helps you control your care. Although spoken wishes may be used, it is better to have your wishes written down. Spoken wishes can be misunderstood, or not followed. Treatments may be given even if you do not want them. An advance directive may make it easier for your family to make difficult choices about your care.   Underweight  Underweight is defined as having a body mass index (BMI) of less than 18.5 kg/m2   Anorexia  is a loss of appetite, decreased food intake, or both. Your appetite naturally decreases as you get older. You also get full faster than you used to. This occurs because your body needs less energy. Other body changes can also lead to a decreased appetite. Even though some appetite loss is normal, you still need to get enough calories and nutrients to keep you healthy. You can start to lose too much weight if you do not eat as much food as your body needs. Unwanted weight loss can cause health problems, or worsen health problems you already have. You can also become dehydrated if you do not drink enough liquid.  How to eat healthy and get enough nutrients:   Choose healthy foods.  Eat a variety of fruits, vegetables, whole grains, low-fat dairy foods, lean meats, and other protein foods. Limit foods high in fat, sugar, and salt. Limit or avoid alcohol as directed. Work with a dietitian to help you plan your meals if you need to follow a special diet. A dietitian can also teach you how to modify foods if you have trouble chewing or swallowing.   Snack on healthy foods between meals  if you only eat a small amount during meals. Snacks provide extra healthy nutrients and calories between meals. Examples include fruit, cheese, and whole grain crackers.    Drink liquids as directed  to avoid dehydration. Drink liquids between meals if they cause you to get full too quickly during meals. Ask how much liquid to drink each day and which liquids are best for you.   Use herbs, spices, and flavor enhancers to add flavor to foods.  Avoid using herbs and spice blends that also contain sodium. Ask your healthcare provider or dietitian about flavor enhancers. Flavor enhancers with ham, natural esposito, and roast beef flavors can also be sprinkled on food to add flavor.   Share meals with others as often as you can.  Eating with others may help you to eat better during meal time. Ask family members, neighbors, or friends to join you for lunch. There are also senior centers where you can meet people, and share meals with them.   Ask family and friends for help  with shopping or preparing foods. Ask for a ride to the grocery store, if needed.       © Copyright [employer] 2018 Information is for End User's use only and may not be sold, redistributed or otherwise used for commercial purposes. All illustrations and images included in CareNotes® are the copyrighted property of [employer].D.A[employer]M., Inc. or [employer]

## 2024-09-13 ENCOUNTER — OFFICE VISIT (OUTPATIENT)
Dept: FAMILY MEDICINE CLINIC | Facility: CLINIC | Age: 69
End: 2024-09-13
Payer: COMMERCIAL

## 2024-09-13 VITALS
SYSTOLIC BLOOD PRESSURE: 128 MMHG | TEMPERATURE: 97.8 F | WEIGHT: 97.2 LBS | HEIGHT: 64 IN | RESPIRATION RATE: 16 BRPM | HEART RATE: 106 BPM | BODY MASS INDEX: 16.59 KG/M2 | OXYGEN SATURATION: 100 % | DIASTOLIC BLOOD PRESSURE: 58 MMHG

## 2024-09-13 DIAGNOSIS — D50.0 IRON DEFICIENCY ANEMIA DUE TO CHRONIC BLOOD LOSS: ICD-10-CM

## 2024-09-13 DIAGNOSIS — M85.89 OSTEOPENIA OF MULTIPLE SITES: ICD-10-CM

## 2024-09-13 DIAGNOSIS — Z12.31 ENCOUNTER FOR SCREENING MAMMOGRAM FOR MALIGNANT NEOPLASM OF BREAST: ICD-10-CM

## 2024-09-13 DIAGNOSIS — K21.9 GERD WITHOUT ESOPHAGITIS: ICD-10-CM

## 2024-09-13 DIAGNOSIS — Z78.0 ASYMPTOMATIC MENOPAUSE: ICD-10-CM

## 2024-09-13 DIAGNOSIS — E03.9 HYPOTHYROIDISM, UNSPECIFIED TYPE: ICD-10-CM

## 2024-09-13 DIAGNOSIS — I10 PRIMARY HYPERTENSION: Primary | ICD-10-CM

## 2024-09-13 DIAGNOSIS — E78.2 MIXED HYPERLIPIDEMIA: ICD-10-CM

## 2024-09-13 DIAGNOSIS — Z00.00 MEDICARE ANNUAL WELLNESS VISIT, SUBSEQUENT: ICD-10-CM

## 2024-09-13 DIAGNOSIS — N18.31 STAGE 3A CHRONIC KIDNEY DISEASE (HCC): ICD-10-CM

## 2024-09-13 PROCEDURE — 99214 OFFICE O/P EST MOD 30 MIN: CPT | Performed by: FAMILY MEDICINE

## 2024-09-13 PROCEDURE — G0439 PPPS, SUBSEQ VISIT: HCPCS | Performed by: FAMILY MEDICINE

## 2024-09-13 RX ORDER — AMLODIPINE AND BENAZEPRIL HYDROCHLORIDE 10; 40 MG/1; MG/1
1 CAPSULE ORAL DAILY
COMMUNITY

## 2024-09-13 NOTE — PROGRESS NOTES
Ambulatory Visit  Name: Jackie Suazo      : 1955      MRN: [id number]  Encounter Provider: Josue Monk MD  Encounter Date: 2024   Encounter department: [facility]    Assessment & Plan  Primary hypertension  STABLE  DENIES ANY CP, SOB, PALPITATIONS, OR HEADACHE  NOTES NO WATER RETENTION  COMPLIANT WITH MEDICATION  NO CONCERNS    - CONTINUE CURRENT TREATMENT PLAN  - MONITOR DIETARY SODIUM INTAKE  - ENCOURAGE PHYSICAL ACTIVITY  - RV 3 MONTHS      Orders:    POCT ECG    Hypothyroidism, unspecified type  STABLE         Mixed hyperlipidemia  WATCHING CHOLESTEROL INTAKE  COMPLIANT WITH MEDICATION  NO CONCERNS    - CONTINUE TO MONITOR DIETARY CHOL INTAKE  - CONTINUE CURRENT MEDICATION  - ENCOURAGED PHYSICAL ACTIVITY           GERD without esophagitis  STABLE  DENIES ANY CP, INDIGESTION, OR COUGH  NOTES NO MELENA OR HEMATOCHEZIA  NO HEMATEMESIS  COMPLIANT WITH MEDICATION  NO CONCERNS    - CONTINUE CURRENT TREATMENT PLAN  - MEDICATION AS PRESCRIBED  - AVOID CAFFEINE, ALCOHOL OR SMOKING           Stage 3a chronic kidney disease (HCC)  Lab Results   Component Value Date    EGFR 71 2024    EGFR 67 2024    EGFR 57 (L) 2024    CREATININE 0.89 2024    CREATININE 0.93 2024    CREATININE 1.06 (H) 2024            Osteopenia of multiple sites         Asymptomatic menopause         Encounter for screening mammogram for malignant neoplasm of breast    Orders:    Mammo screening bilateral w 3d and cad; Future    Iron deficiency anemia due to chronic blood loss         Medicare annual wellness visit, subsequent            Preventive health issues were discussed with patient, and age appropriate screening tests were ordered as noted in patient's After Visit Summary. Personalized health advice and appropriate referrals for health education or preventive services given if needed, as noted in patient's After Visit Summary.    History of Present Illness      PATIENT RETURNS FOR ANNUAL WELLNESS EXAM AND ANNUAL EVALUATION OF PATIENT'S MEDICAL ISSUES    INDIVIDUAL MEDICAL ISSUES WITH THEIR CURRENT STATUS, ASSESSMENT AND PLANS ARE LISTED ABOVE             Patient Care Team:  Josue Monk MD as PCP - General (Family Medicine)  Josue Monk MD as PCP - [facility] ([facility])  Lisa Hubbard MD (Gastroenterology)  Forrest Olmos MD (Oncology)    Review of Systems   Constitutional:  Positive for fatigue. Negative for chills and fever.   HENT:  Negative for congestion, ear discharge, ear pain, mouth sores, postnasal drip, sore throat and trouble swallowing.    Eyes:  Negative for pain, discharge and visual disturbance.   Respiratory:  Negative for cough, shortness of breath and wheezing.    Cardiovascular:  Negative for chest pain, palpitations and leg swelling.   Gastrointestinal:  Negative for abdominal distention, abdominal pain, blood in stool, diarrhea and nausea.   Endocrine: Negative for polydipsia, polyphagia and polyuria.   Genitourinary:  Negative for dysuria, frequency, hematuria and urgency.   Musculoskeletal:  Negative for arthralgias, gait problem and joint swelling.   Skin:  Negative for pallor and rash.   Neurological:  Negative for dizziness, syncope, speech difficulty, weakness, light-headedness, numbness and headaches.   Hematological:  Negative for adenopathy.   Psychiatric/Behavioral:  Negative for behavioral problems, confusion and sleep disturbance. The patient is not nervous/anxious.      Medical History Reviewed by provider this encounter:  Tobacco  Allergies  Meds  Problems  Med Hx  Surg Hx  Fam Hx       Annual Wellness Visit Questionnaire   Jackie is here for her Subsequent Wellness visit.     Health Risk Assessment:   Patient rates overall health as very good. Patient feels that their physical health rating is same. Patient is very satisfied with their life. Eyesight was rated as same. Hearing was rated as same.  Patient feels that their emotional and mental health rating is same. Patients states they are never, rarely angry. Patient states they are never, rarely unusually tired/fatigued. Pain experienced in the last 7 days has been none. Patient states that she has experienced no weight loss or gain in last 6 months.     Fall Risk Screening:   In the past year, patient has experienced: no history of falling in past year      Urinary Incontinence Screening:   Patient has not leaked urine accidently in the last six months.     Home Safety:  Patient does not have trouble with stairs inside or outside of their home. Patient has working smoke alarms and has working carbon monoxide detector. Home safety hazards include: none.     Nutrition:   Current diet is Regular.     Medications:   Patient is currently taking over-the-counter supplements. OTC medications include: see medication list. Patient is able to manage medications.     Activities of Daily Living (ADLs)/Instrumental Activities of Daily Living (IADLs):   Walk and transfer into and out of bed and chair?: Yes  Dress and groom yourself?: Yes    Bathe or shower yourself?: Yes    Feed yourself? Yes  Do your laundry/housekeeping?: Yes  Manage your money, pay your bills and track your expenses?: Yes  Make your own meals?: Yes    Do your own shopping?: Yes    Previous Hospitalizations:   Any hospitalizations or ED visits within the last 12 months?: No      Advance Care Planning:   Living will: Yes    Durable POA for healthcare: Yes    Advanced directive: Yes    Provider agrees with end of life decisions: Yes      Cognitive Screening:   Provider or family/friend/caregiver concerned regarding cognition?: No    PREVENTIVE SCREENINGS      Cardiovascular Screening:    General: Screening Not Indicated and History Lipid Disorder      Diabetes Screening:     General: Screening Current      Colorectal Cancer Screening:     General: Screening Current      Breast Cancer Screening:      "General: Screening Current      Cervical Cancer Screening:    General: Screening Not Indicated      Osteoporosis Screening:    General: Screening Current      Abdominal Aortic Aneurysm (AAA) Screening:        General: Screening Not Indicated      Lung Cancer Screening:     General: Screening Not Indicated      Hepatitis C Screening:    General: Screening Current    Screening, Brief Intervention, and Referral to Treatment (SBIRT)    Screening  Typical number of drinks in a day: 1  Typical number of drinks in a week: 3  Interpretation: Low risk drinking behavior.    AUDIT-C Screenin) How often did you have a drink containing alcohol in the past year? 2 to 4 times a month  2) How many drinks did you have on a typical day when you were drinking in the past year? 1 to 2  3) How often did you have 6 or more drinks on one occasion in the past year? never    AUDIT-C Score: 2  Interpretation: Score 0-2 (female): Negative screen for alcohol misuse    Single Item Drug Screening:  How often have you used an illegal drug (including marijuana) or a prescription medication for non-medical reasons in the past year? never    Single Item Drug Screen Score: 0  Interpretation: Negative screen for possible drug use disorder    Social Determinants of Health     Financial Resource Strain: Low Risk  (2023)    Overall Financial Resource Strain (CARDIA)     Difficulty of Paying Living Expenses: Not hard at all   Transportation Needs: No Transportation Needs (2023)    PRAPARE - Transportation     Lack of Transportation (Medical): No     Lack of Transportation (Non-Medical): No     No results found.    Objective     /58   Pulse (!) 106   Temp 97.8 °F (36.6 °C) (Temporal)   Resp 16   Ht 5' 4\" (1.626 m)   Wt 44.1 kg (97 lb 3.2 oz)   SpO2 100%   BMI 16.68 kg/m²     Physical Exam  Constitutional:       General: She is not in acute distress.     Appearance: Normal appearance. She is well-developed and normal weight. She " is not ill-appearing.   HENT:      Head: Normocephalic and atraumatic.      Right Ear: External ear normal.      Left Ear: External ear normal.      Nose: Nose normal.      Mouth/Throat:      Mouth: Mucous membranes are moist.      Pharynx: No oropharyngeal exudate.   Eyes:      General: No scleral icterus.        Right eye: No discharge.         Left eye: No discharge.      Conjunctiva/sclera: Conjunctivae normal.      Pupils: Pupils are equal, round, and reactive to light.   Neck:      Thyroid: No thyromegaly.      Trachea: No tracheal deviation.   Cardiovascular:      Rate and Rhythm: Normal rate and regular rhythm.      Heart sounds: Normal heart sounds. No murmur heard.     No friction rub. No gallop.   Pulmonary:      Effort: Pulmonary effort is normal. No respiratory distress.      Breath sounds: Normal breath sounds. No wheezing or rales.   Chest:      Chest wall: No tenderness.   Abdominal:      General: Bowel sounds are normal. There is no distension.      Palpations: Abdomen is soft. There is no mass.      Tenderness: There is no abdominal tenderness. There is no guarding or rebound.      Hernia: No hernia is present.   Musculoskeletal:         General: No tenderness or deformity. Normal range of motion.      Cervical back: Normal range of motion and neck supple.   Lymphadenopathy:      Cervical: No cervical adenopathy.   Skin:     General: Skin is warm and dry.      Findings: No erythema or rash.   Neurological:      General: No focal deficit present.      Mental Status: She is alert and oriented to person, place, and time.      Cranial Nerves: No cranial nerve deficit.      Sensory: No sensory deficit.      Motor: No weakness or abnormal muscle tone.      Coordination: Coordination normal.      Gait: Gait normal.      Deep Tendon Reflexes: Reflexes normal.   Psychiatric:         Mood and Affect: Mood normal.         Behavior: Behavior normal.         Thought Content: Thought content normal.          Judgment: Judgment normal.

## 2024-09-13 NOTE — ASSESSMENT & PLAN NOTE
Lab Results   Component Value Date    EGFR 71 09/06/2024    EGFR 67 08/01/2024    EGFR 57 (L) 03/01/2024    CREATININE 0.89 09/06/2024    CREATININE 0.93 08/01/2024    CREATININE 1.06 (H) 03/01/2024

## 2024-09-13 NOTE — LETTER
MARIAH KAY      Current Outpatient Medications:     amLODIPine (NORVASC) 10 mg tablet, Take 1 tablet (10 mg total) by mouth daily, Disp: 30 tablet, Rfl: 0    atovaquone (MEPRON) 750 mg/5 mL suspension, , Disp: , Rfl:     azithromycin (ZITHROMAX) 500 MG tablet, , Disp: , Rfl:     Calcium-Magnesium-Vitamin D (CALCIUM 1200+D3 PO), Take by mouth in the morning, Disp: , Rfl:     ferric carboxymaltose (Injectafer) injection, infuse 750 mg by intravenous route days 1 & 8; may repeat in 4-6 weeks, Disp: , Rfl:     Ferrous Sulfate (IRON PO), Take by mouth daily Half tablet in AM, Half tablet in PM, Disp: , Rfl:     Ferrous Sulfate ER (Slow Fe) 45 MG TBCR, Take 45 mg by mouth daily Pt takes 1/2 pill in AM, 1/2 pill in PM, Disp: , Rfl:     levothyroxine 50 mcg tablet, TAKE 1 TABLET BY MOUTH DAILY (Patient taking differently: Take 50 mcg by mouth daily), Disp: 100 tablet, Rfl: 2    multivitamin (THERAGRAN) TABS, Take 1 tablet by mouth daily, Disp: , Rfl:     omeprazole (PriLOSEC) 40 MG capsule, Take 40 mg by mouth daily, Disp: , Rfl:     Recent Results (from the past 672 hour(s))   CBC and differential    Collection Time: 09/06/24 10:46 AM   Result Value Ref Range    White Blood Cell Count 6.2 3.4 - 10.8 x10E3/uL    Red Blood Cell Count 3.09 (L) 3.77 - 5.28 x10E6/uL    Hemoglobin 9.4 (L) 11.1 - 15.9 g/dL    HCT 29.2 (L) 34.0 - 46.6 %    MCV 95 79 - 97 fL    MCH 30.4 26.6 - 33.0 pg    MCHC 32.2 31.5 - 35.7 g/dL    RDW 13.6 11.7 - 15.4 %    Platelet Count 374 150 - 450 x10E3/uL    Neutrophils 71 Not Estab. %    Lymphocytes 20 Not Estab. %    Monocytes 8 Not Estab. %    Eosinophils 0 Not Estab. %    Basophils PCT 1 Not Estab. %    Neutrophils (Absolute) 4.4 1.4 - 7.0 x10E3/uL    Lymphocytes (Absolute) 1.2 0.7 - 3.1 x10E3/uL    Monocytes (Absolute) 0.5 0.1 - 0.9 x10E3/uL    Eosinophils (Absolute) 0.0 0.0 - 0.4 x10E3/uL    Basophils ABS 0.1 0.0 - 0.2 x10E3/uL    Immature Granulocytes 0 Not Estab. %    Immature Granulocytes  (Absolute) 0.0 0.0 - 0.1 x10E3/uL   Comprehensive metabolic panel    Collection Time: 09/06/24 10:46 AM   Result Value Ref Range    Glucose, Random 88 70 - 99 mg/dL    BUN 29 (H) 8 - 27 mg/dL    Creatinine 0.89 0.57 - 1.00 mg/dL    eGFR 71 >59 mL/min/1.73    SL AMB BUN/CREATININE RATIO 33 (H) 12 - 28    Sodium 135 134 - 144 mmol/L    Potassium 5.1 3.5 - 5.2 mmol/L    Chloride 100 96 - 106 mmol/L    CO2 22 20 - 29 mmol/L    CALCIUM 9.9 8.7 - 10.3 mg/dL    Protein, Total 7.3 6.0 - 8.5 g/dL    Albumin 4.2 3.9 - 4.9 g/dL    Globulin, Total 3.1 1.5 - 4.5 g/dL    TOTAL BILIRUBIN 0.3 0.0 - 1.2 mg/dL    Alk Phos Isoenzymes 101 44 - 121 IU/L    AST 33 0 - 40 IU/L    ALT 18 0 - 32 IU/L   Lipid panel    Collection Time: 09/06/24 10:46 AM   Result Value Ref Range    Cholesterol, Total 204 (H) 100 - 199 mg/dL    Triglycerides 114 0 - 149 mg/dL    HDL 60 >39 mg/dL    VLDL Cholesterol Calculated 20 5 - 40 mg/dL    LDL Calculated 124 (H) 0 - 99 mg/dL    T. Chol/HDL Ratio 3.4 0.0 - 4.4 ratio   TSH, 3rd generation    Collection Time: 09/06/24 10:46 AM   Result Value Ref Range    TSH 2.590 0.450 - 4.500 uIU/mL

## 2024-10-01 ENCOUNTER — TELEPHONE (OUTPATIENT)
Dept: FAMILY MEDICINE CLINIC | Facility: CLINIC | Age: 69
End: 2024-10-01

## 2024-10-01 NOTE — TELEPHONE ENCOUNTER
Jackie and Rinku wanted to know if they are due for Shingrix?  If so, they will schedule at the pharmacy

## 2024-10-02 NOTE — TELEPHONE ENCOUNTER
PLEASE CALL MARY CHARLES HAS COMPLETED HER SERIES - NO FURTHER NEED  KACIE HAS THE OLD SHINGLES VACCINE AND NEEDS TO BE UPDATED WITH THE NEW SERIES

## 2024-11-11 ENCOUNTER — TELEPHONE (OUTPATIENT)
Age: 69
End: 2024-11-11

## 2024-11-15 NOTE — TELEPHONE ENCOUNTER
Patient needs a blank Mammo order she is going outside of [facility]. Someone was supposed to call her back.  Tsering Isaac    Please call patient back

## 2024-11-21 ENCOUNTER — RESULTS FOLLOW-UP (OUTPATIENT)
Dept: FAMILY MEDICINE CLINIC | Facility: CLINIC | Age: 69
End: 2024-11-21

## 2024-11-21 DIAGNOSIS — Z12.31 ENCOUNTER FOR SCREENING MAMMOGRAM FOR MALIGNANT NEOPLASM OF BREAST: ICD-10-CM

## 2024-12-26 ENCOUNTER — TELEPHONE (OUTPATIENT)
Age: 69
End: 2024-12-26

## 2024-12-26 DIAGNOSIS — I10 PRIMARY HYPERTENSION: Primary | ICD-10-CM

## 2024-12-26 RX ORDER — AMLODIPINE AND BENAZEPRIL HYDROCHLORIDE 10; 40 MG/1; MG/1
1 CAPSULE ORAL DAILY
Qty: 90 CAPSULE | Refills: 1 | Status: SHIPPED | OUTPATIENT
Start: 2024-12-26

## 2024-12-26 RX ORDER — AMLODIPINE AND BENAZEPRIL HYDROCHLORIDE 10; 40 MG/1; MG/1
1 CAPSULE ORAL DAILY
Qty: 30 CAPSULE | Refills: 1 | Status: SHIPPED | OUTPATIENT
Start: 2024-12-26 | End: 2024-12-26 | Stop reason: SDUPTHER

## 2024-12-26 NOTE — TELEPHONE ENCOUNTER
Pt. Needs a 90 day supply sent to [facility] home delivery.   But, She also needs a 30 day supply sent to her pharmacy,  [facility]  [place] to get her through until she gets her mail order delivery of   amLODIPine-benazepril (LOTREL) 10-40 MG per capsule   Pt. Took her last one today and is now out of medication. Can you please send the 30 day supply to [facility] today.    Ty

## 2025-01-03 DIAGNOSIS — E03.9 ACQUIRED HYPOTHYROIDISM: ICD-10-CM

## 2025-01-03 RX ORDER — LEVOTHYROXINE SODIUM 50 UG/1
50 TABLET ORAL DAILY
Qty: 100 TABLET | Refills: 1 | Status: SHIPPED | OUTPATIENT
Start: 2025-01-03

## 2025-01-23 DIAGNOSIS — E03.9 HYPOTHYROIDISM, UNSPECIFIED TYPE: Primary | ICD-10-CM

## 2025-01-23 DIAGNOSIS — K21.9 GERD WITHOUT ESOPHAGITIS: ICD-10-CM

## 2025-01-23 DIAGNOSIS — R53.83 OTHER FATIGUE: ICD-10-CM

## 2025-01-23 DIAGNOSIS — D50.9 IRON DEFICIENCY ANEMIA, UNSPECIFIED IRON DEFICIENCY ANEMIA TYPE: ICD-10-CM

## 2025-01-23 DIAGNOSIS — E78.2 MIXED HYPERLIPIDEMIA: ICD-10-CM

## 2025-02-22 ENCOUNTER — RA CDI HCC (OUTPATIENT)
Dept: OTHER | Facility: HOSPITAL | Age: 70
End: 2025-02-22

## 2025-02-23 NOTE — PROGRESS NOTES
HCC coding opportunities          Chart Reviewed number of suggestions sent to Provider: 1   Recommend adding I12.9 - combination code - hypertensive renal disease     Patients Insurance     Medicare Insurance: [employer] Advantage

## 2025-02-28 LAB
ALBUMIN SERPL-MCNC: 4.1 G/DL (ref 3.9–4.9)
ALP SERPL-CCNC: 94 IU/L (ref 44–121)
ALT SERPL-CCNC: 14 IU/L (ref 0–32)
AST SERPL-CCNC: 32 IU/L (ref 0–40)
BASOPHILS # BLD AUTO: 0.1 X10E3/UL (ref 0–0.2)
BASOPHILS NFR BLD AUTO: 1 %
BILIRUB SERPL-MCNC: <0.2 MG/DL (ref 0–1.2)
BUN SERPL-MCNC: 28 MG/DL (ref 8–27)
BUN/CREAT SERPL: 25 (ref 12–28)
CALCIUM SERPL-MCNC: 9.4 MG/DL (ref 8.7–10.3)
CHLORIDE SERPL-SCNC: 100 MMOL/L (ref 96–106)
CHOLEST SERPL-MCNC: 200 MG/DL (ref 100–199)
CHOLEST/HDLC SERPL: 2.9 RATIO (ref 0–4.4)
CO2 SERPL-SCNC: 22 MMOL/L (ref 20–29)
CREAT SERPL-MCNC: 1.14 MG/DL (ref 0.57–1)
EGFR: 52 ML/MIN/1.73
EOSINOPHIL # BLD AUTO: 0 X10E3/UL (ref 0–0.4)
EOSINOPHIL NFR BLD AUTO: 0 %
ERYTHROCYTE [DISTWIDTH] IN BLOOD BY AUTOMATED COUNT: 18.4 % (ref 11.7–15.4)
GLOBULIN SER-MCNC: 2.5 G/DL (ref 1.5–4.5)
GLUCOSE SERPL-MCNC: 94 MG/DL (ref 70–99)
HCT VFR BLD AUTO: 33.6 % (ref 34–46.6)
HDLC SERPL-MCNC: 69 MG/DL
HGB BLD-MCNC: 10.4 G/DL (ref 11.1–15.9)
IMM GRANULOCYTES # BLD: 0.1 X10E3/UL (ref 0–0.1)
IMM GRANULOCYTES NFR BLD: 1 %
LDLC SERPL CALC-MCNC: 113 MG/DL (ref 0–99)
LYMPHOCYTES # BLD AUTO: 0.8 X10E3/UL (ref 0.7–3.1)
LYMPHOCYTES NFR BLD AUTO: 8 %
MCH RBC QN AUTO: 25.6 PG (ref 26.6–33)
MCHC RBC AUTO-ENTMCNC: 31 G/DL (ref 31.5–35.7)
MCV RBC AUTO: 83 FL (ref 79–97)
MONOCYTES # BLD AUTO: 0.7 X10E3/UL (ref 0.1–0.9)
MONOCYTES NFR BLD AUTO: 8 %
NEUTROPHILS # BLD AUTO: 7.4 X10E3/UL (ref 1.4–7)
NEUTROPHILS NFR BLD AUTO: 82 %
NRBC BLD AUTO-RTO: 2 % (ref 0–0)
PLATELET # BLD AUTO: 428 X10E3/UL (ref 150–450)
POTASSIUM SERPL-SCNC: 5 MMOL/L (ref 3.5–5.2)
PROT SERPL-MCNC: 6.6 G/DL (ref 6–8.5)
RBC # BLD AUTO: 4.06 X10E6/UL (ref 3.77–5.28)
SL AMB VLDL CHOLESTEROL CALC: 18 MG/DL (ref 5–40)
SODIUM SERPL-SCNC: 137 MMOL/L (ref 134–144)
TRIGL SERPL-MCNC: 99 MG/DL (ref 0–149)
TSH SERPL DL<=0.005 MIU/L-ACNC: 2.84 UIU/ML (ref 0.45–4.5)
WBC # BLD AUTO: 9 X10E3/UL (ref 3.4–10.8)

## 2025-03-06 RX ORDER — NICOTINE POLACRILEX 4 MG/1
1 GUM, CHEWING ORAL DAILY
COMMUNITY
Start: 2025-02-12

## 2025-03-07 ENCOUNTER — OFFICE VISIT (OUTPATIENT)
Dept: FAMILY MEDICINE CLINIC | Facility: CLINIC | Age: 70
End: 2025-03-07
Payer: COMMERCIAL

## 2025-03-07 VITALS
SYSTOLIC BLOOD PRESSURE: 138 MMHG | OXYGEN SATURATION: 97 % | HEIGHT: 64 IN | WEIGHT: 101 LBS | TEMPERATURE: 97.7 F | RESPIRATION RATE: 14 BRPM | HEART RATE: 97 BPM | BODY MASS INDEX: 17.24 KG/M2 | DIASTOLIC BLOOD PRESSURE: 60 MMHG

## 2025-03-07 DIAGNOSIS — I10 PRIMARY HYPERTENSION: Primary | ICD-10-CM

## 2025-03-07 DIAGNOSIS — K21.9 GERD WITHOUT ESOPHAGITIS: ICD-10-CM

## 2025-03-07 DIAGNOSIS — E78.2 MIXED HYPERLIPIDEMIA: ICD-10-CM

## 2025-03-07 DIAGNOSIS — E03.9 HYPOTHYROIDISM, UNSPECIFIED TYPE: ICD-10-CM

## 2025-03-07 DIAGNOSIS — N18.31 STAGE 3A CHRONIC KIDNEY DISEASE (HCC): ICD-10-CM

## 2025-03-07 PROCEDURE — 99214 OFFICE O/P EST MOD 30 MIN: CPT | Performed by: FAMILY MEDICINE

## 2025-03-07 PROCEDURE — G2211 COMPLEX E/M VISIT ADD ON: HCPCS | Performed by: FAMILY MEDICINE

## 2025-03-07 NOTE — ASSESSMENT & PLAN NOTE
Lab Results   Component Value Date    EGFR 52 (L) 02/28/2025    EGFR 71 09/06/2024    EGFR 67 08/01/2024    CREATININE 1.14 (H) 02/28/2025    CREATININE 0.89 09/06/2024    CREATININE 0.93 08/01/2024

## 2025-03-07 NOTE — LETTER
MARY KAY      Current Outpatient Medications:     Omeprazole 20 MG TBEC, Take 1 capsule by mouth Daily, Disp: , Rfl:     amLODIPine-benazepril (LOTREL) 10-40 MG per capsule, Take 1 capsule by mouth daily, Disp: 90 capsule, Rfl: 1    atovaquone (MEPRON) 750 mg/5 mL suspension, , Disp: , Rfl:     azithromycin (ZITHROMAX) 500 MG tablet, , Disp: , Rfl:     Calcium-Magnesium-Vitamin D (CALCIUM 1200+D3 PO), Take by mouth in the morning, Disp: , Rfl:     Ferrous Sulfate ER (Slow Fe) 45 MG TBCR, Take 45 mg by mouth daily Pt takes 1/2 pill in AM, 1/2 pill in PM, Disp: , Rfl:     levothyroxine 50 mcg tablet, TAKE 1 TABLET BY MOUTH DAILY, Disp: 100 tablet, Rfl: 1    multivitamin (THERAGRAN) TABS, Take 1 tablet by mouth daily, Disp: , Rfl:     Recent Results (from the past 4 weeks)   Comprehensive metabolic panel    Collection Time: 02/28/25 10:26 AM   Result Value Ref Range    Glucose, Random 94 70 - 99 mg/dL    BUN 28 (H) 8 - 27 mg/dL    Creatinine 1.14 (H) 0.57 - 1.00 mg/dL    eGFR 52 (L) >59 mL/min/1.73    SL AMB BUN/CREATININE RATIO 25 12 - 28    Sodium 137 134 - 144 mmol/L    Potassium 5.0 3.5 - 5.2 mmol/L    Chloride 100 96 - 106 mmol/L    CO2 22 20 - 29 mmol/L    CALCIUM 9.4 8.7 - 10.3 mg/dL    Protein, Total 6.6 6.0 - 8.5 g/dL    Albumin 4.1 3.9 - 4.9 g/dL    Globulin, Total 2.5 1.5 - 4.5 g/dL    TOTAL BILIRUBIN <0.2 0.0 - 1.2 mg/dL    Alk Phos Isoenzymes 94 44 - 121 IU/L    AST 32 0 - 40 IU/L    ALT 14 0 - 32 IU/L   CBC and differential    Collection Time: 02/28/25 10:26 AM   Result Value Ref Range    White Blood Cell Count 9.0 3.4 - 10.8 x10E3/uL    Red Blood Cell Count 4.06 3.77 - 5.28 x10E6/uL    Hemoglobin 10.4 (L) 11.1 - 15.9 g/dL    HCT 33.6 (L) 34.0 - 46.6 %    MCV 83 79 - 97 fL    MCH 25.6 (L) 26.6 - 33.0 pg    MCHC 31.0 (L) 31.5 - 35.7 g/dL    RDW 18.4 (H) 11.7 - 15.4 %    Platelet Count 428 150 - 450 x10E3/uL    Neutrophils 82 Not Estab. %    Lymphocytes 8 Not Estab. %    Monocytes 8 Not Estab. %     Eosinophils 0 Not Estab. %    Basophils PCT 1 Not Estab. %    Neutrophils (Absolute) 7.4 (H) 1.4 - 7.0 x10E3/uL    Lymphocytes (Absolute) 0.8 0.7 - 3.1 x10E3/uL    Monocytes (Absolute) 0.7 0.1 - 0.9 x10E3/uL    Eosinophils (Absolute) 0.0 0.0 - 0.4 x10E3/uL    Basophils ABS 0.1 0.0 - 0.2 x10E3/uL    Immature Granulocytes 1 Not Estab. %    Immature Granulocytes (Absolute) 0.1 0.0 - 0.1 x10E3/uL    Nucleated Red Blood Cells 2 (H) 0 - 0 %   Lipid panel    Collection Time: 02/28/25 10:26 AM   Result Value Ref Range    Cholesterol, Total 200 (H) 100 - 199 mg/dL    Triglycerides 99 0 - 149 mg/dL    HDL 69 >39 mg/dL    VLDL Cholesterol Calculated 18 5 - 40 mg/dL    LDL Calculated 113 (H) 0 - 99 mg/dL    T. Chol/HDL Ratio 2.9 0.0 - 4.4 ratio   TSH, 3rd generation    Collection Time: 02/28/25 10:26 AM   Result Value Ref Range    TSH 2.840 0.450 - 4.500 uIU/mL

## 2025-03-07 NOTE — PROGRESS NOTES
Name: Jackie Suazo      : 1955      MRN: [id number]  Encounter Provider: Josue Monk MD  Encounter Date: 3/7/2025   Encounter department: [facility]    Assessment & Plan  Primary hypertension  STABLE  DENIES ANY CP, SOB, PALPITATIONS, OR HEADACHE  NOTES NO WATER RETENTION  COMPLIANT WITH MEDICATION  NO CONCERNS    - CONTINUE CURRENT TREATMENT PLAN  - MONITOR DIETARY SODIUM INTAKE  - ENCOURAGE PHYSICAL ACTIVITY  - RV 3 MONTHS         Mixed hyperlipidemia  WATCHING CHOLESTEROL INTAKE  COMPLIANT WITH MEDICATION  NO CONCERNS    - CONTINUE TO MONITOR DIETARY CHOL INTAKE  - CONTINUE CURRENT MEDICATION  - ENCOURAGED PHYSICAL ACTIVITY         Hypothyroidism, unspecified type         GERD without esophagitis  STABLE  DENIES ANY CP, INDIGESTION, OR COUGH  NOTES NO MELENA OR HEMATOCHEZIA  NO HEMATEMESIS  COMPLIANT WITH MEDICATION  NO CONCERNS    - CONTINUE CURRENT TREATMENT PLAN  - MEDICATION AS PRESCRIBED  - AVOID CAFFEINE, ALCOHOL OR SMOKING         Stage 3a chronic kidney disease (HCC)  Lab Results   Component Value Date    EGFR 52 (L) 2025    EGFR 71 2024    EGFR 67 2024    CREATININE 1.14 (H) 2025    CREATININE 0.89 2024    CREATININE 0.93 2024               History of Present Illness     PATIENT RETURNS FOR ROUTINE EVALUATION OF PATIENT'S MEDICAL ISSUES    INDIVIDUAL MEDICAL ISSUES WITH THEIR CURRENT STATUS, ASSESSMENT AND PLANS ARE LISTED ABOVE            Review of Systems   Constitutional:  Negative for chills, fatigue and fever.   HENT:  Negative for congestion, ear discharge, ear pain, mouth sores, postnasal drip, sore throat and trouble swallowing.    Eyes:  Negative for pain, discharge and visual disturbance.   Respiratory:  Negative for cough, shortness of breath and wheezing.    Cardiovascular:  Negative for chest pain, palpitations and leg swelling.   Gastrointestinal:  Negative for abdominal distention, abdominal pain, blood in  stool, diarrhea and nausea.   Endocrine: Negative for polydipsia, polyphagia and polyuria.   Genitourinary:  Negative for dysuria, frequency, hematuria and urgency.   Musculoskeletal:  Negative for arthralgias, gait problem and joint swelling.   Skin:  Negative for pallor and rash.   Neurological:  Negative for dizziness, syncope, speech difficulty, weakness, light-headedness, numbness and headaches.   Hematological:  Negative for adenopathy.   Psychiatric/Behavioral:  Negative for behavioral problems, confusion and sleep disturbance. The patient is not nervous/anxious.      History reviewed. No pertinent past medical history.  Past Surgical History:   Procedure Laterality Date   • ABDOMINAL SURGERY       History reviewed. No pertinent family history.  Social History     Tobacco Use   • Smoking status: Never     Passive exposure: Never   • Smokeless tobacco: Never   Vaping Use   • Vaping status: Never Used   Substance and Sexual Activity   • Alcohol use: Yes     Comment: SOC   • Drug use: Never   • Sexual activity: Not on file     Current Outpatient Medications on File Prior to Visit   Medication Sig   • amLODIPine-benazepril (LOTREL) 10-40 MG per capsule Take 1 capsule by mouth daily   • Calcium-Magnesium-Vitamin D (CALCIUM 1200+D3 PO) Take by mouth in the morning   • Ferrous Sulfate ER (Slow Fe) 45 MG TBCR Take 45 mg by mouth daily Pt takes 1/2 pill in AM, 1/2 pill in PM   • levothyroxine 50 mcg tablet TAKE 1 TABLET BY MOUTH DAILY   • multivitamin (THERAGRAN) TABS Take 1 tablet by mouth daily   • Omeprazole 20 MG TBEC Take 1 capsule by mouth Daily   • [DISCONTINUED] atovaquone (MEPRON) 750 mg/5 mL suspension  (Patient not taking: Reported on 3/7/2025)   • [DISCONTINUED] azithromycin (ZITHROMAX) 500 MG tablet  (Patient not taking: Reported on 3/7/2025)     Allergies   Allergen Reactions   • Clavulanic Acid Hives   • Amoxicillin Hives and Rash   • Pollen Extract Nasal Congestion     Immunization History  "  Administered Date(s) Administered   • COVID-19 MODERNA VACC 0.5 ML IM 03/03/2021, 03/31/2021, 11/08/2021   • INFLUENZA 10/06/2017   • Influenza, injectable, quadrivalent, preservative free 0.5 mL 10/24/2018, 10/31/2019   • Influenza, seasonal, injectable 1955, 10/12/2014, 10/23/2015, 10/22/2016, 10/12/2021   • Tdap 07/23/2015   • Zoster Vaccine Recombinant 04/28/2018, 07/17/2018     Objective   /60   Pulse 97   Temp 97.7 °F (36.5 °C) (Temporal)   Resp 14   Ht 5' 4\" (1.626 m)   Wt 45.8 kg (101 lb)   SpO2 97%   BMI 17.34 kg/m²     Physical Exam  Constitutional:       General: She is not in acute distress.     Appearance: Normal appearance. She is well-developed and normal weight. She is not ill-appearing.   HENT:      Head: Normocephalic and atraumatic.      Nose: Nose normal.      Mouth/Throat:      Mouth: Mucous membranes are moist.   Eyes:      General:         Right eye: No discharge.         Left eye: No discharge.      Conjunctiva/sclera: Conjunctivae normal.      Pupils: Pupils are equal, round, and reactive to light.   Neck:      Thyroid: No thyromegaly.   Cardiovascular:      Rate and Rhythm: Normal rate and regular rhythm.      Heart sounds: Normal heart sounds. No murmur heard.  Pulmonary:      Effort: Pulmonary effort is normal. No respiratory distress.      Breath sounds: Normal breath sounds. No wheezing or rales.   Abdominal:      General: Bowel sounds are normal.      Palpations: Abdomen is soft.      Tenderness: There is no abdominal tenderness.   Musculoskeletal:         General: No tenderness. Normal range of motion.      Cervical back: Normal range of motion and neck supple.   Lymphadenopathy:      Cervical: No cervical adenopathy.   Skin:     General: Skin is warm and dry.      Findings: No erythema or rash.   Neurological:      Mental Status: She is alert and oriented to person, place, and time.   Psychiatric:         Behavior: Behavior normal.         Thought Content: " Thought content normal.         Judgment: Judgment normal.

## 2025-03-07 NOTE — PATIENT INSTRUCTIONS
CONTINUE CURRENT TREATMENT PLAN  MONITOR DIETARY SODIUM, CHOL INTAKE  ENCOURAGE PHYSICAL ACTIVITY      RV 6 M, SOONER PRN      Recent Results (from the past 4 weeks)   Comprehensive metabolic panel    Collection Time: 02/28/25 10:26 AM   Result Value Ref Range    Glucose, Random 94 70 - 99 mg/dL    BUN 28 (H) 8 - 27 mg/dL    Creatinine 1.14 (H) 0.57 - 1.00 mg/dL    eGFR 52 (L) >59 mL/min/1.73    SL AMB BUN/CREATININE RATIO 25 12 - 28    Sodium 137 134 - 144 mmol/L    Potassium 5.0 3.5 - 5.2 mmol/L    Chloride 100 96 - 106 mmol/L    CO2 22 20 - 29 mmol/L    CALCIUM 9.4 8.7 - 10.3 mg/dL    Protein, Total 6.6 6.0 - 8.5 g/dL    Albumin 4.1 3.9 - 4.9 g/dL    Globulin, Total 2.5 1.5 - 4.5 g/dL    TOTAL BILIRUBIN <0.2 0.0 - 1.2 mg/dL    Alk Phos Isoenzymes 94 44 - 121 IU/L    AST 32 0 - 40 IU/L    ALT 14 0 - 32 IU/L   CBC and differential    Collection Time: 02/28/25 10:26 AM   Result Value Ref Range    White Blood Cell Count 9.0 3.4 - 10.8 x10E3/uL    Red Blood Cell Count 4.06 3.77 - 5.28 x10E6/uL    Hemoglobin 10.4 (L) 11.1 - 15.9 g/dL    HCT 33.6 (L) 34.0 - 46.6 %    MCV 83 79 - 97 fL    MCH 25.6 (L) 26.6 - 33.0 pg    MCHC 31.0 (L) 31.5 - 35.7 g/dL    RDW 18.4 (H) 11.7 - 15.4 %    Platelet Count 428 150 - 450 x10E3/uL    Neutrophils 82 Not Estab. %    Lymphocytes 8 Not Estab. %    Monocytes 8 Not Estab. %    Eosinophils 0 Not Estab. %    Basophils PCT 1 Not Estab. %    Neutrophils (Absolute) 7.4 (H) 1.4 - 7.0 x10E3/uL    Lymphocytes (Absolute) 0.8 0.7 - 3.1 x10E3/uL    Monocytes (Absolute) 0.7 0.1 - 0.9 x10E3/uL    Eosinophils (Absolute) 0.0 0.0 - 0.4 x10E3/uL    Basophils ABS 0.1 0.0 - 0.2 x10E3/uL    Immature Granulocytes 1 Not Estab. %    Immature Granulocytes (Absolute) 0.1 0.0 - 0.1 x10E3/uL    Nucleated Red Blood Cells 2 (H) 0 - 0 %   Lipid panel    Collection Time: 02/28/25 10:26 AM   Result Value Ref Range    Cholesterol, Total 200 (H) 100 - 199 mg/dL    Triglycerides 99 0 - 149 mg/dL    HDL 69 >39 mg/dL    VLDL  Cholesterol Calculated 18 5 - 40 mg/dL    LDL Calculated 113 (H) 0 - 99 mg/dL    T. Chol/HDL Ratio 2.9 0.0 - 4.4 ratio   TSH, 3rd generation    Collection Time: 02/28/25 10:26 AM   Result Value Ref Range    TSH 2.840 0.450 - 4.500 uIU/mL

## 2025-04-04 DIAGNOSIS — I10 PRIMARY HYPERTENSION: ICD-10-CM

## 2025-04-07 RX ORDER — AMLODIPINE AND BENAZEPRIL HYDROCHLORIDE 10; 40 MG/1; MG/1
1 CAPSULE ORAL DAILY
Qty: 90 CAPSULE | Refills: 1 | Status: SHIPPED | OUTPATIENT
Start: 2025-04-07

## 2025-06-18 DIAGNOSIS — I10 PRIMARY HYPERTENSION: ICD-10-CM

## 2025-06-19 RX ORDER — AMLODIPINE AND BENAZEPRIL HYDROCHLORIDE 10; 40 MG/1; MG/1
1 CAPSULE ORAL DAILY
Qty: 100 CAPSULE | Refills: 1 | Status: SHIPPED | OUTPATIENT
Start: 2025-06-19

## 2025-07-04 DIAGNOSIS — E03.9 ACQUIRED HYPOTHYROIDISM: ICD-10-CM

## 2025-07-06 RX ORDER — LEVOTHYROXINE SODIUM 50 UG/1
50 TABLET ORAL DAILY
Qty: 100 TABLET | Refills: 1 | Status: SHIPPED | OUTPATIENT
Start: 2025-07-06